# Patient Record
Sex: FEMALE | Race: BLACK OR AFRICAN AMERICAN | Employment: OTHER | URBAN - METROPOLITAN AREA
[De-identification: names, ages, dates, MRNs, and addresses within clinical notes are randomized per-mention and may not be internally consistent; named-entity substitution may affect disease eponyms.]

---

## 2022-04-02 ENCOUNTER — HOSPITAL ENCOUNTER (EMERGENCY)
Age: 82
Discharge: HOME | End: 2022-04-02
Attending: EMERGENCY MEDICINE
Payer: MEDICARE

## 2022-04-02 VITALS
HEART RATE: 90 BPM | SYSTOLIC BLOOD PRESSURE: 120 MMHG | WEIGHT: 130 LBS | BODY MASS INDEX: 23.78 KG/M2 | TEMPERATURE: 97.8 F | OXYGEN SATURATION: 100 % | DIASTOLIC BLOOD PRESSURE: 69 MMHG | RESPIRATION RATE: 18 BRPM

## 2022-04-02 DIAGNOSIS — T78.3XXA ANGIOTENSIN CONVERTING ENZYME INHIBITOR-AGGRAVATED ANGIOEDEMA, INITIAL ENCOUNTER: Primary | ICD-10-CM

## 2022-04-02 DIAGNOSIS — T46.4X5A ANGIOTENSIN CONVERTING ENZYME INHIBITOR-AGGRAVATED ANGIOEDEMA, INITIAL ENCOUNTER: Primary | ICD-10-CM

## 2022-04-02 PROCEDURE — 6370000000 HC RX 637 (ALT 250 FOR IP): Performed by: EMERGENCY MEDICINE

## 2022-04-02 PROCEDURE — 6360000002 HC RX W HCPCS: Performed by: EMERGENCY MEDICINE

## 2022-04-02 PROCEDURE — 2500000003 HC RX 250 WO HCPCS: Performed by: EMERGENCY MEDICINE

## 2022-04-02 PROCEDURE — 99285 EMERGENCY DEPT VISIT HI MDM: CPT

## 2022-04-02 PROCEDURE — 2580000003 HC RX 258: Performed by: EMERGENCY MEDICINE

## 2022-04-02 PROCEDURE — 96365 THER/PROPH/DIAG IV INF INIT: CPT

## 2022-04-02 PROCEDURE — 96375 TX/PRO/DX INJ NEW DRUG ADDON: CPT

## 2022-04-02 RX ORDER — HYDROCHLOROTHIAZIDE 25 MG/1
25 TABLET ORAL DAILY
Qty: 30 TABLET | Refills: 3 | Status: SHIPPED | OUTPATIENT
Start: 2022-04-02

## 2022-04-02 RX ORDER — DIPHENHYDRAMINE HYDROCHLORIDE 50 MG/ML
25 INJECTION INTRAMUSCULAR; INTRAVENOUS ONCE
Status: COMPLETED | OUTPATIENT
Start: 2022-04-02 | End: 2022-04-02

## 2022-04-02 RX ORDER — PREDNISONE 20 MG/1
60 TABLET ORAL ONCE
Status: COMPLETED | OUTPATIENT
Start: 2022-04-02 | End: 2022-04-02

## 2022-04-02 RX ORDER — TRANEXAMIC ACID 100 MG/ML
INJECTION, SOLUTION INTRAVENOUS
Status: DISCONTINUED
Start: 2022-04-02 | End: 2022-04-02 | Stop reason: HOSPADM

## 2022-04-02 RX ORDER — PREDNISONE 20 MG/1
TABLET ORAL
Qty: 21 TABLET | Refills: 0 | Status: SHIPPED | OUTPATIENT
Start: 2022-04-02

## 2022-04-02 RX ADMIN — TRANEXAMIC ACID 1000 MG: 1 INJECTION, SOLUTION INTRAVENOUS at 11:04

## 2022-04-02 RX ADMIN — PREDNISONE 60 MG: 20 TABLET ORAL at 10:41

## 2022-04-02 RX ADMIN — DIPHENHYDRAMINE HYDROCHLORIDE 25 MG: 50 INJECTION, SOLUTION INTRAMUSCULAR; INTRAVENOUS at 10:41

## 2022-04-02 NOTE — ED PROVIDER NOTES
HPI:  4/2/22,   Time: 10:33 AM EDT       Stella Parra is a 78 y.o. female presenting to the ED for facial swelling, beginning 1 day ago. The complaint has been persistent, moderate in severity, and worsened by nothing. On ace, started yesterday, swelling to lips and face. No difficulty breathing. Skin is dry. No fever/chills/sweats/n/v/d/cough/congest.  Bib private vehicle. Nothing makes better. No new meds/creams/soaps    Review of Systems:   Pertinent positives and negatives are stated within HPI, all other systems reviewed and are negative.          --------------------------------------------- PAST HISTORY ---------------------------------------------  Past Medical History:  has a past medical history of Hyperlipidemia, Hypertension, and Thyroid disease. Past Surgical History:  has a past surgical history that includes Breast surgery and Thyroidectomy. Social History:  reports that she has never smoked. She has never used smokeless tobacco.    Family History: family history is not on file. The patients home medications have been reviewed. Allergies: Patient has no known allergies. ---------------------------------------------------PHYSICAL EXAM--------------------------------------    Constitutional/General: Alert and oriented x3, well appearing, non toxic in NAD  Head: Normocephalic and swelling mychal cheeks and lips, skin is dry, no erythema  Eyes: PERRL, EOMI, conjunctive normal, sclera non icteric  Mouth: Oropharynx clear, handling secretions, no trismus, no asymmetry of the posterior oropharynx or uvular edema  Neck: Supple, full ROM,   Respiratory:  Not in respiratory distress  Cardiovascular:  . 2+ distal pulses    GI:  Abdomen Soft, Non tender, Non distended. Musculoskeletal: Moves all extremities x 4. Warm and well perfused, no clubbing, cyanosis, or edema. Capillary refill <3 seconds  Integument: skin warm and dry. No rashes.    Lymphatic: no lymphadenopathy noted  Neurologic: GCS 15, no focal deficits,   Psychiatric: Normal Affect    -------------------------------------------------- RESULTS -------------------------------------------------  I have personally reviewed all laboratory and imaging results for this patient. Results are listed below. LABS:  No results found for this visit on 04/02/22. RADIOLOGY:  Interpreted by Radiologist.  No orders to display       EKG: This EKG is signed and interpreted by the EP. Time:   Rate:   Rhythm:   Interpretation:   Comparison:       ------------------------- NURSING NOTES AND VITALS REVIEWED ---------------------------   The nursing notes within the ED encounter and vital signs as below have been reviewed by myself. /69   Pulse 90   Temp 97.8 °F (36.6 °C) (Oral)   Resp 18   Wt 130 lb (59 kg)   SpO2 100%   BMI 23.78 kg/m²   Oxygen Saturation Interpretation: Normal    The patients available past medical records and past encounters were reviewed. ------------------------------ ED COURSE/MEDICAL DECISION MAKING----------------------  Medications   tranexamic acid (CYKLOKAPRON) 1000 MG/10ML injection (has no administration in time range)   predniSONE (DELTASONE) tablet 60 mg (60 mg Oral Given 4/2/22 1041)   diphenhydrAMINE (BENADRYL) injection 25 mg (25 mg IntraVENous Given 4/2/22 1041)   tranexamic acid (CYKLOKAPRON) 1,000 mg in dextrose 5 % 100 mL IVPB (0 mg IntraVENous Stopped 4/2/22 1120)         ED COURSE:       Medical Decision Making:    Clinically ace angioedema, meds given, no progression, pt told to stop ace, hctz ordered to replace. Dc on steroids/outpt fu      This patient's ED course included: a personal history and physicial examination    This patient has remained hemodynamically stable during their ED course. Re-Evaluations:             Re-evaluation.   Patients symptoms show no change    Re-examination  4/2/22   3pM EDT          Vital Signs:   Vitals:    04/02/22 1130 04/02/22 1200 04/02/22 1300 04/02/22 1415   BP:  119/63 129/70 120/69   Pulse: 86 85 92 90   Resp:  18 17 18   Temp:       TempSrc:       SpO2: 98% 98% 100% 100%   Weight:         No change in condition, airway patent, no post oropharynx swelling        Counseling: The emergency provider has spoken with the patient and discussed todays results, in addition to providing specific details for the plan of care and counseling regarding the diagnosis and prognosis. Questions are answered at this time and they are agreeable with the plan.       --------------------------------- IMPRESSION AND DISPOSITION ---------------------------------    IMPRESSION  1. Angiotensin converting enzyme inhibitor-aggravated angioedema, initial encounter        DISPOSITION  Disposition: Discharge to home  Patient condition is stable    NOTE: This report was transcribed using voice recognition software.  Every effort was made to ensure accuracy; however, inadvertent computerized transcription errors may be present        Mirna Clark MD  04/02/22 7173

## 2022-12-27 ENCOUNTER — HOSPITAL ENCOUNTER (EMERGENCY)
Age: 82
Discharge: HOME | End: 2022-12-27
Attending: EMERGENCY MEDICINE
Payer: MEDICARE

## 2022-12-27 VITALS
SYSTOLIC BLOOD PRESSURE: 166 MMHG | OXYGEN SATURATION: 97 % | WEIGHT: 115 LBS | BODY MASS INDEX: 20.38 KG/M2 | DIASTOLIC BLOOD PRESSURE: 72 MMHG | RESPIRATION RATE: 17 BRPM | HEIGHT: 63 IN | TEMPERATURE: 98.2 F | HEART RATE: 73 BPM

## 2022-12-27 DIAGNOSIS — T78.3XXA ANGIOEDEMA, INITIAL ENCOUNTER: Primary | ICD-10-CM

## 2022-12-27 PROCEDURE — 2580000003 HC RX 258: Performed by: NURSE PRACTITIONER

## 2022-12-27 PROCEDURE — 2500000003 HC RX 250 WO HCPCS: Performed by: NURSE PRACTITIONER

## 2022-12-27 PROCEDURE — 6360000002 HC RX W HCPCS: Performed by: NURSE PRACTITIONER

## 2022-12-27 PROCEDURE — A4216 STERILE WATER/SALINE, 10 ML: HCPCS | Performed by: NURSE PRACTITIONER

## 2022-12-27 PROCEDURE — 96365 THER/PROPH/DIAG IV INF INIT: CPT

## 2022-12-27 PROCEDURE — 99284 EMERGENCY DEPT VISIT MOD MDM: CPT

## 2022-12-27 PROCEDURE — 96375 TX/PRO/DX INJ NEW DRUG ADDON: CPT

## 2022-12-27 RX ORDER — DIPHENHYDRAMINE HYDROCHLORIDE 50 MG/ML
25 INJECTION INTRAMUSCULAR; INTRAVENOUS ONCE
Status: COMPLETED | OUTPATIENT
Start: 2022-12-27 | End: 2022-12-27

## 2022-12-27 RX ORDER — PREDNISONE 50 MG/1
50 TABLET ORAL DAILY
Qty: 5 TABLET | Refills: 0 | Status: SHIPPED | OUTPATIENT
Start: 2022-12-27 | End: 2023-01-01

## 2022-12-27 RX ORDER — METHYLPREDNISOLONE SODIUM SUCCINATE 125 MG/2ML
125 INJECTION, POWDER, LYOPHILIZED, FOR SOLUTION INTRAMUSCULAR; INTRAVENOUS ONCE
Status: COMPLETED | OUTPATIENT
Start: 2022-12-27 | End: 2022-12-27

## 2022-12-27 RX ADMIN — METHYLPREDNISOLONE SODIUM SUCCINATE 125 MG: 125 INJECTION, POWDER, FOR SOLUTION INTRAMUSCULAR; INTRAVENOUS at 03:25

## 2022-12-27 RX ADMIN — TRANEXAMIC ACID 1000 MG: 1 INJECTION, SOLUTION INTRAVENOUS at 04:39

## 2022-12-27 RX ADMIN — DIPHENHYDRAMINE HYDROCHLORIDE 25 MG: 50 INJECTION, SOLUTION INTRAMUSCULAR; INTRAVENOUS at 03:23

## 2022-12-27 RX ADMIN — FAMOTIDINE 20 MG: 10 INJECTION, SOLUTION INTRAVENOUS at 03:27

## 2022-12-27 ASSESSMENT — PAIN DESCRIPTION - DESCRIPTORS: DESCRIPTORS: BURNING

## 2022-12-27 ASSESSMENT — PAIN - FUNCTIONAL ASSESSMENT
PAIN_FUNCTIONAL_ASSESSMENT: NONE - DENIES PAIN
PAIN_FUNCTIONAL_ASSESSMENT: 0-10

## 2022-12-27 ASSESSMENT — PAIN DESCRIPTION - ORIENTATION: ORIENTATION: RIGHT;LEFT

## 2022-12-27 ASSESSMENT — LIFESTYLE VARIABLES: HOW OFTEN DO YOU HAVE A DRINK CONTAINING ALCOHOL: NEVER

## 2022-12-27 ASSESSMENT — PAIN DESCRIPTION - LOCATION: LOCATION: FACE

## 2022-12-27 NOTE — ED PROVIDER NOTES
ED physician  HPI:  12/27/22, Time: 3:36 AM PIA Membreno is a 78 y.o. female presenting to the ED for rash as well as upper lip swelling. Patient presents to the emergency department with noticing originally a rash to her right side of her nose and upper lip area on Friday. States that she has been putting on Vicks to the site. States that since then she has been having increasing swelling primarily to her upper lip. Patient is not on any ACE inhibitor's. She has had similar episodes and was seen April 2022 with angioedema. Patient denies any throat tightening or drooling or any shortness of breath as well as no noted wheezing. Patient denies any new meds, soaps, lotions or detergents. States that she notices that it still swelling and not getting any improvement. Unaware of where she got the rash. She also denies any fevers. Patient also denies any chest pains. Patient denies taking any other meds except using the Vicks for comfort relief. Patient with symptoms moderate in severity and persistent  Review of Systems:   A complete review of systems was performed and pertinent positives and negatives are stated within HPI, all other systems reviewed and are negative.          --------------------------------------------- PAST HISTORY ---------------------------------------------  Past Medical History:  has a past medical history of Hyperlipidemia, Hypertension, and Thyroid disease. Past Surgical History:  has a past surgical history that includes Breast surgery and Thyroidectomy. Social History:  reports that she has never smoked. She has never used smokeless tobacco.    Family History: family history is not on file. The patients home medications have been reviewed. Allergies: Patient has no known allergies.     -------------------------------------------------- RESULTS -------------------------------------------------  All laboratory and radiology results have been personally reviewed by myself   LABS:  No results found for this visit on 12/27/22. RADIOLOGY:  Interpreted by Radiologist.  No orders to display       ------------------------- NURSING NOTES AND VITALS REVIEWED ---------------------------   The nursing notes within the ED encounter and vital signs as below have been reviewed. BP (!) 166/72   Pulse 73   Temp 98.2 °F (36.8 °C)   Resp 17   Ht 5' 3\" (1.6 m)   Wt 115 lb (52.2 kg)   SpO2 97%   BMI 20.37 kg/m²   Oxygen Saturation Interpretation: Normal      ---------------------------------------------------PHYSICAL EXAM--------------------------------------      Constitutional/General: Alert and oriented x3, moderately uncomfortable   head: Normocephalic and atraumatic  Eyes: PERRL, EOMI  Mouth: Oropharynx clear, handling secretions, no trismus, negative for any swelling noted to the tongue but does have upper lip swelling with notable erythema   Patient also with erythema and slight nasal swelling. Neck: Supple, full ROM,   Pulmonary: Lungs clear to auscultation bilaterally, no wheezes, rales, or rhonchi. Not in respiratory distress, no wheezing no stridor, lungs remain clear throughout all fields anteriorly sign or and posteriorly  Cardiovascular:  Regular rate and rhythm, no murmurs, gallops, or rubs. 2+ distal pulses  Abdomen: Soft, non tender, non distended,   Extremities: Moves all extremities x 4. Warm and well perfused  Skin: warm and dry,  negative for any swelling noted to the tongue but does have upper lip swelling with notable erythema above upper lip. Patient also with erythema and slight nasal swelling.   Neurologic: GCS 15,  Psych: Normal Affect to      ------------------------------ ED COURSE/MEDICAL DECISION MAKING----------------------  Medications   famotidine (PEPCID) 20 mg in sodium chloride (PF) 0.9 % 10 mL injection (20 mg IntraVENous Given 12/27/22 0327)   methylPREDNISolone sodium (SOLU-MEDROL) injection 125 mg (125 mg IntraVENous Given 12/27/22 0325)   diphenhydrAMINE (BENADRYL) injection 25 mg (25 mg IntraVENous Given 12/27/22 0323)   tranexamic acid (CYKLOKAPRON) 1,000 mg in sodium chloride 0.9 % 100 mL IVPB (0 mg IntraVENous Stopped 12/27/22 0529)         ED COURSE:  ED Course as of 12/27/22 0638   Tue Dec 27, 2022   1151 Patient reexamined at this time. Patient doing much better. Swelling improved. Patient feeling well. Patient will undergo further observation. [CB]   6368 Reexamined at this time. Swelling completely resolved. Patient feeling at baseline. No shortness of breath. No difficulty swallowing. Vital signs remained stable. Counseled patient on angioedema. Patient stable for discharge. She will follow-up with her PCP. Return precautions given. Patient agrees with plan. [CB]      ED Course User Index  [CB] Filipe Suarez DO       Medical Decision Making:    Plan will be to provide patient with IV Pepcid, IV Solu-Medrol as well as IV Benadryl as well as provide her with continuous pulse ox. Patient does have notable upper lip swelling as well as notable erythema  I did review patient's medications and patient is not on any ACE inhibitor's. She also denies any new meds, soaps, lotions or detergents. Airway intact. No wheezing or stridor. Speech clear and coherent. Will provide patient with TXA IV and then reevaluate. 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 I did go back in to reevaluate patient, she was resting comfortably, lungs do remain clear, no wheezing no stridor. The upper lip is just slightly decreased she does report already feeling markedly improved after receiving the IV Pepcid 20 mg, IV Solu-Medrol 125 mg as well as IV Benadryl 25 mg. Will provide patient with IV TXA and then once again reevaluate. Patient signed off to emergency room attending. Counseling:    The emergency provider has spoken with the patient and discussed todays results, in addition to providing specific details for the plan of care and counseling regarding the diagnosis and prognosis. Questions are answered at this time and they are agreeable with the plan.      --------------------------------- IMPRESSION AND DISPOSITION ---------------------------------    IMPRESSION  1. Angioedema, initial encounter        DISPOSITION  Disposition:   Patient condition is good      NOTE: This report was transcribed using voice recognition software.  Every effort was made to ensure accuracy; however, inadvertent computerized transcription errors may be present          STEPHANIE Lao CNP  12/27/22 222 Sae Andrade DO  12/27/22 4924

## 2022-12-27 NOTE — ED NOTES
Patient verbalized understanding of dc and follow up orders as well as medication administration. Iv discontinued, catheter intact, no signs of infiltration or adverse effects, patient ambulated and walked out of ed, no further questions, nad.       Neto Nix RN  12/27/22 7279

## 2023-02-27 ENCOUNTER — APPOINTMENT (OUTPATIENT)
Dept: GENERAL RADIOLOGY | Age: 83
DRG: 181 | End: 2023-02-27
Payer: MEDICARE

## 2023-02-27 ENCOUNTER — APPOINTMENT (OUTPATIENT)
Dept: CT IMAGING | Age: 83
DRG: 181 | End: 2023-02-27
Payer: MEDICARE

## 2023-02-27 ENCOUNTER — HOSPITAL ENCOUNTER (INPATIENT)
Age: 83
LOS: 8 days | Discharge: HOME | DRG: 181 | End: 2023-03-08
Attending: EMERGENCY MEDICINE | Admitting: INTERNAL MEDICINE
Payer: MEDICARE

## 2023-02-27 DIAGNOSIS — R91.8 PULMONARY NODULES: ICD-10-CM

## 2023-02-27 DIAGNOSIS — C79.9 METASTATIC MALIGNANT NEOPLASM, UNSPECIFIED SITE (HCC): Primary | ICD-10-CM

## 2023-02-27 DIAGNOSIS — R18.8 OTHER ASCITES: ICD-10-CM

## 2023-02-27 LAB
ALBUMIN SERPL-MCNC: 3.4 G/DL (ref 3.5–5.2)
ALP BLD-CCNC: 60 U/L (ref 35–104)
ALT SERPL-CCNC: 5 U/L (ref 0–32)
ANION GAP SERPL CALCULATED.3IONS-SCNC: 15 MMOL/L (ref 7–16)
AST SERPL-CCNC: 21 U/L (ref 0–31)
BACTERIA: ABNORMAL /HPF
BASOPHILS ABSOLUTE: 0.06 E9/L (ref 0–0.2)
BASOPHILS RELATIVE PERCENT: 0.5 % (ref 0–2)
BILIRUB SERPL-MCNC: 0.5 MG/DL (ref 0–1.2)
BILIRUBIN URINE: ABNORMAL
BLOOD, URINE: ABNORMAL
BUN BLDV-MCNC: 20 MG/DL (ref 6–23)
CALCIUM SERPL-MCNC: 9.2 MG/DL (ref 8.6–10.2)
CHLORIDE BLD-SCNC: 97 MMOL/L (ref 98–107)
CLARITY: CLEAR
CO2: 27 MMOL/L (ref 22–29)
COLOR: YELLOW
CREAT SERPL-MCNC: 1.1 MG/DL (ref 0.5–1)
EOSINOPHILS ABSOLUTE: 0.02 E9/L (ref 0.05–0.5)
EOSINOPHILS RELATIVE PERCENT: 0.2 % (ref 0–6)
GFR SERPL CREATININE-BSD FRML MDRD: 49 ML/MIN/1.73
GLUCOSE BLD-MCNC: 87 MG/DL (ref 74–99)
GLUCOSE URINE: NEGATIVE MG/DL
HCT VFR BLD CALC: 40.9 % (ref 34–48)
HEMOGLOBIN: 14.2 G/DL (ref 11.5–15.5)
IMMATURE GRANULOCYTES #: 0.04 E9/L
IMMATURE GRANULOCYTES %: 0.3 % (ref 0–5)
KETONES, URINE: 15 MG/DL
LACTIC ACID: 1.9 MMOL/L (ref 0.5–2.2)
LEUKOCYTE ESTERASE, URINE: ABNORMAL
LIPASE: 12 U/L (ref 13–60)
LYMPHOCYTES ABSOLUTE: 1.6 E9/L (ref 1.5–4)
LYMPHOCYTES RELATIVE PERCENT: 13.9 % (ref 20–42)
MCH RBC QN AUTO: 26.6 PG (ref 26–35)
MCHC RBC AUTO-ENTMCNC: 34.7 % (ref 32–34.5)
MCV RBC AUTO: 76.6 FL (ref 80–99.9)
MONOCYTES ABSOLUTE: 0.73 E9/L (ref 0.1–0.95)
MONOCYTES RELATIVE PERCENT: 6.4 % (ref 2–12)
NEUTROPHILS ABSOLUTE: 9.04 E9/L (ref 1.8–7.3)
NEUTROPHILS RELATIVE PERCENT: 78.7 % (ref 43–80)
NITRITE, URINE: NEGATIVE
PDW BLD-RTO: 13.2 FL (ref 11.5–15)
PH UA: 5.5 (ref 5–9)
PLATELET # BLD: 303 E9/L (ref 130–450)
PMV BLD AUTO: 11.3 FL (ref 7–12)
POTASSIUM SERPL-SCNC: 4.1 MMOL/L (ref 3.5–5)
PROTEIN UA: 30 MG/DL
RBC # BLD: 5.34 E12/L (ref 3.5–5.5)
RBC UA: ABNORMAL /HPF (ref 0–2)
SODIUM BLD-SCNC: 139 MMOL/L (ref 132–146)
SPECIFIC GRAVITY UA: >=1.03 (ref 1–1.03)
TOTAL PROTEIN: 6.7 G/DL (ref 6.4–8.3)
TROPONIN, HIGH SENSITIVITY: 14 NG/L (ref 0–9)
TROPONIN, HIGH SENSITIVITY: 14 NG/L (ref 0–9)
TROPONIN, HIGH SENSITIVITY: 16 NG/L (ref 0–9)
UROBILINOGEN, URINE: 1 E.U./DL
WBC # BLD: 11.5 E9/L (ref 4.5–11.5)
WBC UA: ABNORMAL /HPF (ref 0–5)

## 2023-02-27 PROCEDURE — 6360000002 HC RX W HCPCS

## 2023-02-27 PROCEDURE — 81001 URINALYSIS AUTO W/SCOPE: CPT

## 2023-02-27 PROCEDURE — 85025 COMPLETE CBC W/AUTO DIFF WBC: CPT

## 2023-02-27 PROCEDURE — 80053 COMPREHEN METABOLIC PANEL: CPT

## 2023-02-27 PROCEDURE — 96374 THER/PROPH/DIAG INJ IV PUSH: CPT

## 2023-02-27 PROCEDURE — 83690 ASSAY OF LIPASE: CPT

## 2023-02-27 PROCEDURE — 99285 EMERGENCY DEPT VISIT HI MDM: CPT

## 2023-02-27 PROCEDURE — 6360000004 HC RX CONTRAST MEDICATION: Performed by: RADIOLOGY

## 2023-02-27 PROCEDURE — 84484 ASSAY OF TROPONIN QUANT: CPT

## 2023-02-27 PROCEDURE — 2580000003 HC RX 258

## 2023-02-27 PROCEDURE — 71045 X-RAY EXAM CHEST 1 VIEW: CPT

## 2023-02-27 PROCEDURE — 83605 ASSAY OF LACTIC ACID: CPT

## 2023-02-27 PROCEDURE — 74177 CT ABD & PELVIS W/CONTRAST: CPT

## 2023-02-27 PROCEDURE — 2580000003 HC RX 258: Performed by: RADIOLOGY

## 2023-02-27 PROCEDURE — 93005 ELECTROCARDIOGRAM TRACING: CPT | Performed by: PHYSICIAN ASSISTANT

## 2023-02-27 RX ORDER — PHENOL 1.4 %
1 AEROSOL, SPRAY (ML) MUCOUS MEMBRANE 2 TIMES DAILY
COMMUNITY

## 2023-02-27 RX ORDER — ALENDRONATE SODIUM 70 MG/1
70 TABLET ORAL
COMMUNITY
Start: 2023-01-17

## 2023-02-27 RX ORDER — SODIUM CHLORIDE 0.9 % (FLUSH) 0.9 %
10 SYRINGE (ML) INJECTION PRN
Status: DISCONTINUED | OUTPATIENT
Start: 2023-02-27 | End: 2023-02-28

## 2023-02-27 RX ORDER — 0.9 % SODIUM CHLORIDE 0.9 %
1000 INTRAVENOUS SOLUTION INTRAVENOUS ONCE
Status: COMPLETED | OUTPATIENT
Start: 2023-02-27 | End: 2023-02-28

## 2023-02-27 RX ORDER — FERROUS SULFATE 325(65) MG
325 TABLET ORAL NIGHTLY
COMMUNITY

## 2023-02-27 RX ORDER — ACETAMINOPHEN 160 MG
2000 TABLET,DISINTEGRATING ORAL DAILY
COMMUNITY

## 2023-02-27 RX ORDER — ASPIRIN 81 MG/1
81 TABLET ORAL DAILY
COMMUNITY

## 2023-02-27 RX ORDER — SIMVASTATIN 20 MG
20 TABLET ORAL NIGHTLY
COMMUNITY
Start: 2023-01-17

## 2023-02-27 RX ORDER — MULTIVITAMIN WITH FOLIC ACID 400 MCG
1 TABLET ORAL NIGHTLY
COMMUNITY
Start: 2022-11-29

## 2023-02-27 RX ORDER — LEVOTHYROXINE SODIUM 0.07 MG/1
75 TABLET ORAL DAILY
COMMUNITY
Start: 2023-01-17

## 2023-02-27 RX ORDER — ONDANSETRON 2 MG/ML
4 INJECTION INTRAMUSCULAR; INTRAVENOUS ONCE
Status: COMPLETED | OUTPATIENT
Start: 2023-02-27 | End: 2023-02-27

## 2023-02-27 RX ADMIN — ONDANSETRON 4 MG: 2 INJECTION INTRAMUSCULAR; INTRAVENOUS at 21:38

## 2023-02-27 RX ADMIN — Medication 10 ML: at 22:24

## 2023-02-27 RX ADMIN — IOPAMIDOL 75 ML: 755 INJECTION, SOLUTION INTRAVENOUS at 22:23

## 2023-02-27 RX ADMIN — SODIUM CHLORIDE 1000 ML: 9 INJECTION, SOLUTION INTRAVENOUS at 21:38

## 2023-02-27 ASSESSMENT — PAIN - FUNCTIONAL ASSESSMENT: PAIN_FUNCTIONAL_ASSESSMENT: NONE - DENIES PAIN

## 2023-02-27 ASSESSMENT — LIFESTYLE VARIABLES: HOW MANY STANDARD DRINKS CONTAINING ALCOHOL DO YOU HAVE ON A TYPICAL DAY: PATIENT DOES NOT DRINK

## 2023-02-27 NOTE — LETTER
[place] Department Medicaid  CERTIFICATION OF NECESSITY  FOR NON-EMERGENCY TRANSPORTATION   BY GROUND AMBULANCE      Individual Information   1. Name: Kimmie Shetty 2. [place] Medicaid Billing Number:    3. Address: [address] [place] [address]      Transportation Provider Information   4. Provider Name:    5. [place] Medicaid Provider Number:  National Provider Identifier (NPI):      Certification  7. Criteria:  During transport, this individual requires:  [x] Medical treatment or continuous     supervision by an EMT. [] The administration or regulation of oxygen by another person. [] Supervised protective restraint. 8. Period Beginning Date: 3/8/23   9. Length   [x] Not more than 1 day(s)  [] One Year     Additional Information Relevant to Certification   10. 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 Name of Practitioner: Aliza Sol MD   12. [place] Medicaid Provider Number, If Applicable:  [address] Provider Identifier (NPI):      Signature Information   14. Date of Signature: 3/8/23 15. Name of Person Signing: Hua Vines   12. Signature and Professional Designation: Electronically signed by SHARMAINE Miller on 3/8/2023 at 3:34 PM     [facility] [id number]  Rev. 7/2015          [address] Encounter Date/Time: 2/27/2023 [facility]    Hospital Account: [de-identified]    MRN: [id number]    Patient: Melinda Tello Serial #: [id number]      ENCOUNTER          Patient Class: I Private Enc?   No Unit RM BD: SEYZ [id number]   Hospital Service: MED   Encounter DX: Metastatic cancer to [facility]*   ADM Provider: Francine Mason DO   Procedure:     ATT Provider: Aliza Sol MD   REF Provider:        Admission DX: Metastatic cancer to [facility]), Pulmonary nodules, Other ascites, Metastatic malignant neoplasm, unspecified site [facility]) and DX codes: C78.00, R91.8, R18.8, C79.9      PATIENT                 Name: Francois Fischer : 1940 (82 yrs)   Address: [address] [place] [address] Sex: Female   City: [address] [place] [address]         Marital Status:    Employer: RETIRED         [employer]: None   Primary Care Provider: Shayna Wise DO         Primary Phone: [phone]   EMERGENCY CONTACT   Contact Name Legal Guardian? Relationship to Patient Home Phone Work Phone   1. Sheila Corbett  2. Lyly Corbett      Child  Child    [phone]         GUARANTOR            Guarantor: Francois Fischer     : 1940   Address: [address] [place] [address] Sex: Female     [place] [address]     Relation to Patient: Self       Home Phone: [phone]   Guarantor ID: [id number]       Work Phone:     Guarantor Employer: RETIRED         Status: RETIRED      COVERAGE        PRIMARY INSURANCE   Payor: [facility] MEDICARE Plan: Jose BAUTISTA*   Payor Address: ,          Group Number:   Insurance Type: Count includes the [facility] Name: Calleen Due : 1940   Subscriber ID: [id number] Pat. Rel. to Sub: Self   SECONDARY INSURANCE   Payor: [place]ICA[place] Plan: [address] [place] [address] DEPT OF*   Payor Address:  [address], [address]          Group Number:   Insurance Type: INDEMNITY   Subscriber Name: Calleen Due : 1940   Subscriber ID: [id number] Pat.  Rel. to Sub: SELF      CSN: [id number]

## 2023-02-27 NOTE — ED NOTES
FIRST PROVIDER CONTACT ASSESSMENT NOTE       Department of Emergency Medicine                 First Provider Note            23  1:56 PM EST    Date of Encounter: No admission date for patient encounter. Patient Name: Ana Dye  : 1940  MRN: [id number]    Chief Complaint: No chief complaint on file. History of Present Illness:   Ana Dye is a 78 y.o. female who presents to the ED for lack of appetite for several weeks. Denies abdominal pain. Denies vomiting. Focused Physical Exam:  VS:    ED Triage Vitals [23 1217]   BP Temp Temp src Heart Rate Resp SpO2 Height Weight   -- 97.7 °F (36.5 °C) -- (!) 120 -- 96 % -- --        Physical Ex: Constitutional: Alert and non-toxic. Medical History:  has a past medical history of Hyperlipidemia, Hypertension, and Thyroid disease. Surgical History:  has a past surgical history that includes Breast surgery and Thyroidectomy. Social History:  reports that she has never smoked. She has never used smokeless tobacco.  Family History: family history is not on file. Allergies: Patient has no known allergies.      Initial Plan of Care: Initiate Treatment-Testing, Proceed toTreatment Area When Bed Available for ED Attending/MLP to Continue Care      ---END OF FIRST PROVIDER CONTACT ASSESSMENT NOTE---  Electronically signed by CARLOS Cruz   DD: 23       CARLOS Cruz  23 [id number]

## 2023-02-28 PROBLEM — C78.00 METASTATIC CANCER TO LUNG (HCC): Status: ACTIVE | Noted: 2023-02-28

## 2023-02-28 LAB
ALBUMIN SERPL-MCNC: 2.8 G/DL (ref 3.5–5.2)
ALP BLD-CCNC: 44 U/L (ref 35–104)
ALT SERPL-CCNC: 5 U/L (ref 0–32)
ANION GAP SERPL CALCULATED.3IONS-SCNC: 13 MMOL/L (ref 7–16)
AST SERPL-CCNC: 17 U/L (ref 0–31)
BASOPHILS ABSOLUTE: 0.05 E9/L (ref 0–0.2)
BASOPHILS RELATIVE PERCENT: 0.5 % (ref 0–2)
BILIRUB SERPL-MCNC: 0.4 MG/DL (ref 0–1.2)
BUN BLDV-MCNC: 22 MG/DL (ref 6–23)
CA 125: 77.6 U/ML (ref 0–35)
CALCIUM SERPL-MCNC: 8.1 MG/DL (ref 8.6–10.2)
CEA: 0.6 NG/ML (ref 0–5.2)
CHLORIDE BLD-SCNC: 102 MMOL/L (ref 98–107)
CO2: 25 MMOL/L (ref 22–29)
CREAT SERPL-MCNC: 1 MG/DL (ref 0.5–1)
EOSINOPHILS ABSOLUTE: 0.06 E9/L (ref 0.05–0.5)
EOSINOPHILS RELATIVE PERCENT: 0.6 % (ref 0–6)
GFR SERPL CREATININE-BSD FRML MDRD: 55 ML/MIN/1.73
GLUCOSE BLD-MCNC: 94 MG/DL (ref 74–99)
HCT VFR BLD CALC: 33.6 % (ref 34–48)
HEMOGLOBIN: 11.4 G/DL (ref 11.5–15.5)
IMMATURE GRANULOCYTES #: 0.06 E9/L
IMMATURE GRANULOCYTES %: 0.6 % (ref 0–5)
LYMPHOCYTES ABSOLUTE: 1.41 E9/L (ref 1.5–4)
LYMPHOCYTES RELATIVE PERCENT: 14.1 % (ref 20–42)
MCH RBC QN AUTO: 27 PG (ref 26–35)
MCHC RBC AUTO-ENTMCNC: 33.9 % (ref 32–34.5)
MCV RBC AUTO: 79.6 FL (ref 80–99.9)
MONOCYTES ABSOLUTE: 0.79 E9/L (ref 0.1–0.95)
MONOCYTES RELATIVE PERCENT: 7.9 % (ref 2–12)
NEUTROPHILS ABSOLUTE: 7.65 E9/L (ref 1.8–7.3)
NEUTROPHILS RELATIVE PERCENT: 76.3 % (ref 43–80)
PDW BLD-RTO: 13.2 FL (ref 11.5–15)
PLATELET # BLD: 243 E9/L (ref 130–450)
PMV BLD AUTO: 11.2 FL (ref 7–12)
POTASSIUM REFLEX MAGNESIUM: 3.8 MMOL/L (ref 3.5–5)
RBC # BLD: 4.22 E12/L (ref 3.5–5.5)
SODIUM BLD-SCNC: 140 MMOL/L (ref 132–146)
TOTAL PROTEIN: 5.1 G/DL (ref 6.4–8.3)
WBC # BLD: 10 E9/L (ref 4.5–11.5)

## 2023-02-28 PROCEDURE — 85025 COMPLETE CBC W/AUTO DIFF WBC: CPT

## 2023-02-28 PROCEDURE — 86304 IMMUNOASSAY TUMOR CA 125: CPT

## 2023-02-28 PROCEDURE — 6370000000 HC RX 637 (ALT 250 FOR IP): Performed by: FAMILY MEDICINE

## 2023-02-28 PROCEDURE — 2140000000 HC CCU INTERMEDIATE R&B

## 2023-02-28 PROCEDURE — 2580000003 HC RX 258: Performed by: FAMILY MEDICINE

## 2023-02-28 PROCEDURE — 80053 COMPREHEN METABOLIC PANEL: CPT

## 2023-02-28 PROCEDURE — 82378 CARCINOEMBRYONIC ANTIGEN: CPT

## 2023-02-28 RX ORDER — CALCIUM CARBONATE 200(500)MG
500 TABLET,CHEWABLE ORAL 3 TIMES DAILY PRN
Status: DISCONTINUED | OUTPATIENT
Start: 2023-02-28 | End: 2023-03-09 | Stop reason: HOSPADM

## 2023-02-28 RX ORDER — ACETAMINOPHEN 325 MG/1
650 TABLET ORAL EVERY 6 HOURS PRN
Status: DISCONTINUED | OUTPATIENT
Start: 2023-02-28 | End: 2023-03-09 | Stop reason: HOSPADM

## 2023-02-28 RX ORDER — POLYETHYLENE GLYCOL 3350 17 G/17G
17 POWDER, FOR SOLUTION ORAL DAILY PRN
Status: DISCONTINUED | OUTPATIENT
Start: 2023-02-28 | End: 2023-03-09 | Stop reason: HOSPADM

## 2023-02-28 RX ORDER — HYDRALAZINE HYDROCHLORIDE 20 MG/ML
10 INJECTION INTRAMUSCULAR; INTRAVENOUS EVERY 6 HOURS PRN
Status: DISCONTINUED | OUTPATIENT
Start: 2023-02-28 | End: 2023-03-09 | Stop reason: HOSPADM

## 2023-02-28 RX ORDER — SODIUM CHLORIDE 0.9 % (FLUSH) 0.9 %
10 SYRINGE (ML) INJECTION EVERY 12 HOURS SCHEDULED
Status: DISCONTINUED | OUTPATIENT
Start: 2023-02-28 | End: 2023-03-09 | Stop reason: HOSPADM

## 2023-02-28 RX ORDER — ASPIRIN 81 MG/1
81 TABLET ORAL DAILY
Status: DISCONTINUED | OUTPATIENT
Start: 2023-02-28 | End: 2023-03-09 | Stop reason: HOSPADM

## 2023-02-28 RX ORDER — LANOLIN ALCOHOL/MO/W.PET/CERES
3 CREAM (GRAM) TOPICAL NIGHTLY PRN
Status: DISCONTINUED | OUTPATIENT
Start: 2023-02-28 | End: 2023-03-09 | Stop reason: HOSPADM

## 2023-02-28 RX ORDER — ATORVASTATIN CALCIUM 10 MG/1
10 TABLET, FILM COATED ORAL DAILY
Status: DISCONTINUED | OUTPATIENT
Start: 2023-02-28 | End: 2023-03-09 | Stop reason: HOSPADM

## 2023-02-28 RX ORDER — FERROUS SULFATE 325(65) MG
325 TABLET ORAL
Status: DISCONTINUED | OUTPATIENT
Start: 2023-02-28 | End: 2023-03-09 | Stop reason: HOSPADM

## 2023-02-28 RX ORDER — ONDANSETRON 2 MG/ML
4 INJECTION INTRAMUSCULAR; INTRAVENOUS EVERY 6 HOURS PRN
Status: DISCONTINUED | OUTPATIENT
Start: 2023-02-28 | End: 2023-03-09 | Stop reason: HOSPADM

## 2023-02-28 RX ORDER — SODIUM CHLORIDE 9 MG/ML
INJECTION, SOLUTION INTRAVENOUS PRN
Status: DISCONTINUED | OUTPATIENT
Start: 2023-02-28 | End: 2023-03-09 | Stop reason: HOSPADM

## 2023-02-28 RX ORDER — HYDROCHLOROTHIAZIDE 25 MG/1
25 TABLET ORAL DAILY
Status: DISCONTINUED | OUTPATIENT
Start: 2023-02-28 | End: 2023-03-09 | Stop reason: HOSPADM

## 2023-02-28 RX ORDER — ENOXAPARIN SODIUM 100 MG/ML
30 INJECTION SUBCUTANEOUS DAILY
Status: DISCONTINUED | OUTPATIENT
Start: 2023-02-28 | End: 2023-03-09 | Stop reason: HOSPADM

## 2023-02-28 RX ORDER — PROMETHAZINE HYDROCHLORIDE 12.5 MG/1
12.5 TABLET ORAL EVERY 6 HOURS PRN
Status: DISCONTINUED | OUTPATIENT
Start: 2023-02-28 | End: 2023-03-09 | Stop reason: HOSPADM

## 2023-02-28 RX ORDER — SODIUM CHLORIDE 0.9 % (FLUSH) 0.9 %
10 SYRINGE (ML) INJECTION PRN
Status: DISCONTINUED | OUTPATIENT
Start: 2023-02-28 | End: 2023-03-09 | Stop reason: HOSPADM

## 2023-02-28 RX ORDER — ACETAMINOPHEN 650 MG/1
650 SUPPOSITORY RECTAL EVERY 6 HOURS PRN
Status: DISCONTINUED | OUTPATIENT
Start: 2023-02-28 | End: 2023-03-09 | Stop reason: HOSPADM

## 2023-02-28 RX ADMIN — SODIUM CHLORIDE, PRESERVATIVE FREE 10 ML: 5 INJECTION INTRAVENOUS at 21:32

## 2023-02-28 RX ADMIN — LEVOTHYROXINE SODIUM 75 MCG: 0.07 TABLET ORAL at 07:43

## 2023-02-28 RX ADMIN — SODIUM CHLORIDE, PRESERVATIVE FREE 10 ML: 5 INJECTION INTRAVENOUS at 16:52

## 2023-02-28 RX ADMIN — FERROUS SULFATE TAB 325 MG (65 MG ELEMENTAL FE) 325 MG: 325 (65 FE) TAB at 07:43

## 2023-02-28 NOTE — PROGRESS NOTES
Unable to reach RN at this time. Patient came to CT department with saline bag running in infiltrated left AC IV. CT removed and placed new IV and scan was preformed.

## 2023-02-28 NOTE — ED PROVIDER NOTES
[facility] ENCOUNTER        Pt Name: Bari Feldman  MRN: [id number]  [place] 1940  Date of evaluation: 2/27/2023  Provider: Saloni Sosa MD  PCP: Reyes Villarreal DO  Note Started: 11:14 PM EST 2/27/23    CHIEF COMPLAINT       Chief Complaint   Patient presents with    Other     Patient states for he last week she has had a loss of appetitie. She has been forcing herself to drink broth and water. Denies abdominal pain and vomiting. HISTORY OF PRESENT ILLNESS: 1 or more Elements   History From: Patient    Limitations to history : None    Bari Feldman is a 78 y.o. female who presents with worsening loss of appetite, nausea, and generalized weakness. Patient states that symptoms have been progressively worsening since 2019 states that she has been progressively losing weight due to poor appetite. She denies any abdominal pain or vomiting but does endorses nausea with eating. Patient states that she has been having some vaginal spotting and has had ultrasound of the pelvis and mammogram, she states that the mammogram resulted in dense breast tissue and a week ultrasound she is unclear but was told to follow-up with OB/GYN. States that she was unable to follow-up with OB/GYN due to transport issues. Describes symptoms moderate in severity with no alleviating or exacerbating factors. Denies any fever, chills, headache, dizziness, vision changes, neck tenderness or stiffness, cp, palpitations, leg swelling/tenderness, sob, cough, abd pain, dysuria, hematuria, diarrhea, constipation, bloody stools.     Nursing Notes were all reviewed and agreed with or any disagreements were addressed in the HPI.    ROS:   Pertinent positives and negatives are stated within HPI, all other systems reviewed and are negative.    --------------------------------------------- PAST HISTORY ---------------------------------------------  Past Medical History:  has a past medical history of Hyperlipidemia, Hypertension, and Thyroid disease. Past Surgical History:  has a past surgical history that includes Breast surgery and Thyroidectomy. Social History:  reports that she has never smoked. She has never used smokeless tobacco.    Family History: family history is not on file. The patients home medications have been reviewed. Allergies: Patient has no known allergies. ---------------------------------------------------PHYSICAL EXAM--------------------------------------        Constitutional/General: Alert and oriented x3, well appearing, non toxic in NAD  Head: Normocephalic and atraumatic  Mouth: Oropharynx clear, handling secretions, no trismus  Neck: Supple, full ROM,  Pulmonary: Lungs clear to auscultation bilaterally, no wheezes, rales, or rhonchi. Not in respiratory distress  Cardiovascular:  Regular rate. Regular rhythm. No murmurs  Chest: no chest wall tenderness  Abdomen: Soft. Non tender. Non distended. No rebound, guarding, or rigidity. No pulsatile masses appreciated. Musculoskeletal: Moves all extremities x 4. Neurovascularly intact. Warm and well perfused, no clubbing, cyanosis, or edema. Compartments are soft and compressible. Capillary refill <3 seconds. Skin: warm and dry. No rashes. Neurologic: GCS 15, no gross focal neurologic deficits  Psych: Normal Affect    -------------------------------------------------- RESULTS -------------------------------------------------  I have personally reviewed all laboratory and imaging results for this patient. Results are listed below.      LABS:  Results for orders placed or performed during the hospital encounter of 02/27/23   CBC with Auto Differential   Result Value Ref Range    WBC 11.5 4.5 - 11.5 E9/L    RBC 5.34 3.50 - 5.50 E12/L    Hemoglobin 14.2 11.5 - 15.5 g/dL    Hematocrit 40.9 34.0 - 48.0 %    MCV 76.6 (L) 80.0 - 99.9 fL    MCH 26.6 26.0 - 35.0 pg    MCHC 34.7 (H) 32.0 - 34.5 %    RDW 13.2 11.5 - 15.0 fL    Platelets 413 083 - 753 E9/L    MPV 11.3 7.0 - 12.0 fL    Neutrophils % 78.7 43.0 - 80.0 %    Immature Granulocytes % 0.3 0.0 - 5.0 %    Lymphocytes % 13.9 (L) 20.0 - 42.0 %    Monocytes % 6.4 2.0 - 12.0 %    Eosinophils % 0.2 0.0 - 6.0 %    Basophils % 0.5 0.0 - 2.0 %    Neutrophils Absolute 9.04 (H) 1.80 - 7.30 E9/L    Immature Granulocytes # 0.04 E9/L    Lymphocytes Absolute 1.60 1.50 - 4.00 E9/L    Monocytes Absolute 0.73 0.10 - 0.95 E9/L    Eosinophils Absolute 0.02 (L) 0.05 - 0.50 E9/L    Basophils Absolute 0.06 0.00 - 0.20 E9/L   Comprehensive Metabolic Panel   Result Value Ref Range    Sodium 139 132 - 146 mmol/L    Potassium 4.1 3.5 - 5.0 mmol/L    Chloride 97 (L) 98 - 107 mmol/L    CO2 27 22 - 29 mmol/L    Anion Gap 15 7 - 16 mmol/L    Glucose 87 74 - 99 mg/dL    BUN 20 6 - 23 mg/dL    Creatinine 1.1 (H) 0.5 - 1.0 mg/dL    Est, Glom Filt Rate 49 >=60 mL/min/1.73    Calcium 9.2 8.6 - 10.2 mg/dL    Total Protein 6.7 6.4 - 8.3 g/dL    Albumin 3.4 (L) 3.5 - 5.2 g/dL    Total Bilirubin 0.5 0.0 - 1.2 mg/dL    Alkaline Phosphatase 60 35 - 104 U/L    ALT 5 0 - 32 U/L    AST 21 0 - 31 U/L   Lactic Acid   Result Value Ref Range    Lactic Acid 1.9 0.5 - 2.2 mmol/L   Lipase   Result Value Ref Range    Lipase 12 (L) 13 - 60 U/L   Urinalysis with Microscopic   Result Value Ref Range    Color, UA Yellow Straw/Yellow    Clarity, UA Clear Clear    Glucose, Ur Negative Negative mg/dL    Bilirubin Urine LARGE (A) Negative    Ketones, Urine 15 (A) Negative mg/dL    Specific Gravity, UA >=1.030 1.005 - 1.030    Blood, Urine MODERATE (A) Negative    pH, UA 5.5 5.0 - 9.0    Protein, UA 30 (A) Negative mg/dL    Urobilinogen, Urine 1.0 <2.0 E.U./dL    Nitrite, Urine Negative Negative    Leukocyte Esterase, Urine TRACE (A) Negative    WBC, UA 2-5 0 - 5 /HPF    RBC, UA 2-5 0 - 2 /HPF    Bacteria, UA FEW (A) None Seen /HPF   Troponin   Result Value Ref Range    Troponin, High Sensitivity 14 (H) 0 - 9 ng/L   Troponin   Result Value Ref Range    Troponin, High Sensitivity 14 (H) 0 - 9 ng/L   Troponin   Result Value Ref Range    Troponin, High Sensitivity 16 (H) 0 - 9 ng/L   Comprehensive Metabolic Panel w/ Reflex to MG   Result Value Ref Range    Sodium 140 132 - 146 mmol/L    Potassium reflex Magnesium 3.8 3.5 - 5.0 mmol/L    Chloride 102 98 - 107 mmol/L    CO2 25 22 - 29 mmol/L    Anion Gap 13 7 - 16 mmol/L    Glucose 94 74 - 99 mg/dL    BUN 22 6 - 23 mg/dL    Creatinine 1.0 0.5 - 1.0 mg/dL    Est, Glom Filt Rate 55 >=60 mL/min/1.73    Calcium 8.1 (L) 8.6 - 10.2 mg/dL    Total Protein 5.1 (L) 6.4 - 8.3 g/dL    Albumin 2.8 (L) 3.5 - 5.2 g/dL    Total Bilirubin 0.4 0.0 - 1.2 mg/dL    Alkaline Phosphatase 44 35 - 104 U/L    ALT 5 0 - 32 U/L    AST 17 0 - 31 U/L   CBC with Auto Differential   Result Value Ref Range    WBC 10.0 4.5 - 11.5 E9/L    RBC 4.22 3.50 - 5.50 E12/L    Hemoglobin 11.4 (L) 11.5 - 15.5 g/dL    Hematocrit 33.6 (L) 34.0 - 48.0 %    MCV 79.6 (L) 80.0 - 99.9 fL    MCH 27.0 26.0 - 35.0 pg    MCHC 33.9 32.0 - 34.5 %    RDW 13.2 11.5 - 15.0 fL    Platelets 381 469 - 174 E9/L    MPV 11.2 7.0 - 12.0 fL    Neutrophils % 76.3 43.0 - 80.0 %    Immature Granulocytes % 0.6 0.0 - 5.0 %    Lymphocytes % 14.1 (L) 20.0 - 42.0 %    Monocytes % 7.9 2.0 - 12.0 %    Eosinophils % 0.6 0.0 - 6.0 %    Basophils % 0.5 0.0 - 2.0 %    Neutrophils Absolute 7.65 (H) 1.80 - 7.30 E9/L    Immature Granulocytes # 0.06 E9/L    Lymphocytes Absolute 1.41 (L) 1.50 - 4.00 E9/L    Monocytes Absolute 0.79 0.10 - 0.95 E9/L    Eosinophils Absolute 0.06 0.05 - 0.50 E9/L    Basophils Absolute 0.05 0.00 - 0.20 E9/L   EKG 12 Lead   Result Value Ref Range    Ventricular Rate 118 BPM    Atrial Rate 118 BPM    P-R Interval 116 ms    QRS Duration 88 ms    Q-T Interval 334 ms    QTc Calculation (Bazett) 468 ms    P Axis 74 degrees    R Axis -24 degrees    T Axis 82 degrees       RADIOLOGY:   Non-plain film images such as CT, Ultrasound and MRI are read by the radiologist. Adrianne Bells radiographic images are visualized and preliminarily interpreted by the ED Provider with the below findings:    CXR with bilateral nodules no obvious effusions or consolidations concerning pna, no ptx       Interpretation per the Radiologist below, if available at the time of this note:    CT ABDOMEN PELVIS W IV CONTRAST Additional Contrast? None   Final Result   Numerous pulmonary nodules within the visualized lung bases, concerning for   pulmonary metastatic disease. Moderate ascites with extensive peritoneal soft tissue nodules and omental   caking, compatible with the peritoneal metastatic disease. Leiomyomatous uterus. Densely sclerotic osseous lesions, overall increased in number and size from   prior examination. These may represent bone islands. However, given   findings above, osseous metastatic disease is not entirely excluded. XR CHEST PORTABLE   Final Result   Bilateral nodular opacities, concerning for underlying pulmonary nodules. Recommend attention on subsequent CT abdomen/pelvis. Clinical correlation   recommended. Consider further evaluation with dedicated CT chest, as   indicated. CT ABDOMEN PELVIS W IV CONTRAST Additional Contrast? None    Result Date: 2/27/2023  EXAMINATION: CT OF THE ABDOMEN AND PELVIS WITH CONTRAST 2/27/2023 10:17 pm TECHNIQUE: CT of the abdomen and pelvis was performed with the administration of intravenous contrast. Multiplanar reformatted images are provided for review. Automated exposure control, iterative reconstruction, and/or weight based adjustment of the mA/kV was utilized to reduce the radiation dose to as low as reasonably achievable.  COMPARISON: 10/13/2009 HISTORY: ORDERING SYSTEM PROVIDED HISTORY: nausea + weight loss TECHNOLOGIST PROVIDED HISTORY: Reason for exam:->nausea + weight loss Additional Contrast?->None Decision Support Exception - unselect if not a suspected or confirmed emergency medical condition->Emergency Medical Condition (MA) What reading provider will be dictating this exam?->CRC FINDINGS: Lower Chest: There are numerous pulmonary nodules within the visualized lung bases. A left lower lobe nodule measures 1.1 cm. A peripheral right lower lobe nodule measures 1 cm. There is trace right pleural effusion. Organs: The liver, gallbladder, spleen, pancreas, adrenals, and right kidney are unremarkable. There is a small left renal cyst. GI/Bowel: There is no evidence of bowel obstruction. No evidence of abnormal bowel wall thickening or distension. There are scattered diverticula. The appendix is not confidently identified. However, there is no inflammatory change in the right lower quadrant to suggest acute appendicitis. Pelvis: The urinary bladder is largely decompressed. Leiomyomatous uterus noted. Peritoneum/Retroperitoneum: There is moderate ascites. There are extensive peritoneal soft tissue nodules with omental caking. No evidence of lymphadenopathy. Aorta is normal in caliber. Bones/Soft Tissues: There are numerous densely sclerotic lesions throughout the visualized osseous structures, favored to represent enostoses. Numerous pulmonary nodules within the visualized lung bases, concerning for pulmonary metastatic disease. Moderate ascites with extensive peritoneal soft tissue nodules and omental caking, compatible with the peritoneal metastatic disease. Leiomyomatous uterus. Densely sclerotic osseous lesions, overall increased in number and size from prior examination. These may represent bone islands. However, given findings above, osseous metastatic disease is not entirely excluded.      XR CHEST PORTABLE    Result Date: 2/27/2023  EXAMINATION: ONE XRAY VIEW OF THE CHEST 2/27/2023 9:51 pm COMPARISON: 01/19/2017 HISTORY: ORDERING SYSTEM PROVIDED HISTORY: Shortness of breath TECHNOLOGIST PROVIDED HISTORY: Reason for exam:->Shortness of breath What reading provider will be dictating this exam?->CRC FINDINGS: The cardiomediastinal silhouette is within normal limits. There are scattered bilateral nodular opacities, measuring up to 9 mm. Streaky left basilar opacity, likely atelectasis. No pneumothorax or pleural effusion. Bilateral nodular opacities, concerning for underlying pulmonary nodules. Recommend attention on subsequent CT abdomen/pelvis. Clinical correlation recommended. Consider further evaluation with dedicated CT chest, as indicated. No results found. Procedures:      ------------------------- NURSING NOTES AND VITALS REVIEWED ---------------------------   The nursing notes within the ED encounter and vital signs as below have been reviewed by myself and ED attending. /68   Pulse 97   Temp 98.3 °F (36.8 °C)   Resp 16   Ht 5' 2\" (1.575 m)   Wt 109 lb (49.4 kg)   SpO2 95%   BMI 19.94 kg/m²   Oxygen Saturation Interpretation: Normal    The patients available past medical records and past encounters were reviewed by myself and ED attending        ------------------------------ ED COURSE/MEDICAL DECISION MAKING----------------------    Medical Decision Making/Differential Diagnosis:    CC/HPI Summary, Pertinent Physical Exam Findings, Social Determinants of health, Records Reviewed, DDx, testing done/not done, ED Course, Reassessment, disposition considerations/shared decision making with patient, consults, disposition:      Medical Decision Making/ED COURSE:    Chronic Conditions affecting care:    has a past medical history of Hyperlipidemia, Hypertension, and Thyroid disease. 78 y.o. female who presents with worsening loss of appetite, nausea, and generalized weakness. Myself and ED attending interpreted findings during patient's stay.    Vital signs /68   Pulse 97   Temp 98.3 °F (36.8 °C)   Resp 16   Ht 5' 2\" (1.575 m)   Wt 109 lb (49.4 kg)   SpO2 95%   BMI 19.94 kg/m²  Patient was placed on the cardiac monitor. Rhythm - Sinus tach. While in the ED patient was tachycardia but otherwise hemodynamically stable, afebrile, nontoxic-appearing, in no respiratory distress. Physical exam remarkable for  Working diagnosis include cancer, dehydration, ACS, electrolyte imbalance, anemia, UTI  Labs interpreted by me CBC with no leukocytosis or significant anemia,  CMP with stable renal and liver function, no electrolyte derangement. Lactic acid and lipase normal, trop 14 with delta of 2. CXR with bilateral nodular opacities, concerning for underlying pulmonary nodules. No effusions or consolidations, no ptx. CT abdomen numerous pulmonary nodules within the visualized lung bases, concerning for pulmonary metastatic disease. Moderate ascites with extensive peritoneal soft tissue nodules, compatible with the peritoneal metastatic disease. Leiomyomatous uterus. These may represent bone islands, osseous metastatic disease is not entirely excluded. EKG interpreted by me sinus tachycardia, LAE with no sign of acute ischemia. Patient administered IV: Fluids, Zofran. On reevaluation patient had significant symptomatic relief. She was updated on CT findings. Consultation with hospitalist.  The patient will be admitted for further treatment and evaluation for   1. Metastatic malignant neoplasm, unspecified site ([address]    2. Pulmonary nodules    3. Other ascites      IP CONSULT TO HOSPITALIST  IP CONSULT TO HEM/ONC      Counseling: The emergency provider has spoken with the patient and discussed todays results, in addition to providing specific details for the plan of care and counseling regarding the diagnosis and prognosis. Questions are answered at this time and they are agreeable with the plan. ED Course as of 02/28/23 0709   Mon Feb 27, 2023   0823 EKG: This EKG is signed by emergency department physician.     Rate: 118  Qtc: 468ms  Rhythm: Sinus tach   Interpretation: sinus tachycardia, LAE  Comparison: new sinus tachycardia otherwise stable as compared to patient's most recent EKG      [TC]   Tue Feb 28, 2023   0005 Patient admitted to Dr. Brandy Hunter MD       Medications   aspirin EC tablet 81 mg (has no administration in time range)   ferrous sulfate (IRON 325) tablet 325 mg (has no administration in time range)   hydroCHLOROthiazide (HYDRODIURIL) tablet 25 mg (has no administration in time range)   levothyroxine (SYNTHROID) tablet 75 mcg (has no administration in time range)   atorvastatin (LIPITOR) tablet 10 mg (has no administration in time range)   sodium chloride flush 0.9 % injection 10 mL (has no administration in time range)   sodium chloride flush 0.9 % injection 10 mL (has no administration in time range)   0.9 % sodium chloride infusion (has no administration in time range)   enoxaparin Sodium (LOVENOX) injection 30 mg (has no administration in time range)   promethazine (PHENERGAN) tablet 12.5 mg (has no administration in time range)     Or   ondansetron (ZOFRAN) injection 4 mg (has no administration in time range)   polyethylene glycol (GLYCOLAX) packet 17 g (has no administration in time range)   acetaminophen (TYLENOL) tablet 650 mg (has no administration in time range)     Or   acetaminophen (TYLENOL) suppository 650 mg (has no administration in time range)   0.9 % sodium chloride bolus (0 mLs IntraVENous Stopped 2/28/23 0118)   ondansetron (ZOFRAN) injection 4 mg (4 mg IntraVENous Given 2/27/23 2138)   iopamidol (ISOVUE-370) 76 % injection 75 mL (75 mLs IntraVENous Given 2/27/23 2223)       New Prescriptions    No medications on file         Discussion with Other Profesionals : Admitting Team      Social Determinants : None    Records Reviewed : Source previous x-ray in 2017 showing no nodules. CONSULTS: admitting       --------------------------------- IMPRESSION AND DISPOSITION ---------------------------------    IMPRESSION  1.  Metastatic malignant neoplasm, unspecified site ([facility] [address]    2. Pulmonary nodules    3. Other ascites        DISPOSITION  Disposition: Admit to telemetry  Patient condition is stable        NOTE: This report was transcribed using voice recognition software.  Every effort was made to ensure accuracy; however, inadvertent computerized transcription errors may be present           Maty Thomson MD  Resident  02/28/23 [id number]

## 2023-02-28 NOTE — CONSULTS
[facility]  Hematology/Oncology  Consult      Patient Name: Richardson Martinez  YOB: 1940  PCP: Trev Riley DO   Referring Provider:      Reason for Consultation:   Chief Complaint   Patient presents with    Other     Patient states for he last week she has had a loss of appetitie. She has been forcing herself to drink broth and water. Denies abdominal pain and vomiting. History of Present Illness: This is an 49-year-old female patient with a PMH of thyroid disease, HTN, HLD who presented to the ED for evaluation of worsening appetite, nausea, and generalized weakness. Patient is also reported vaginal bleeding in which she has had an ultrasound recently and was told to follow-up with OB/GYN however was unable to do so due to transport issues. Patient has been afebrile and on room air, however tachycardic. Chest x-ray with bilateral nodular opacities concerning for underlying pulmonary nodules. No effusions or consolidation. CTAP with numerous pulmonary nodules within the visualized lung bases concerning for pulmonary metastatic disease. Moderate ascites with extensive peritoneal soft tissue nodules, compatible with peritoneal metastatic disease. Leiomyomatous uterus. These may represent bone islands, osseous metastatic disease is not entirely excluded. Tumor markers with Ca 125 77.5, CEA 0.6. CMP with GFR 55 and calcium 8.1. Troponin 16. LFTs unremarkable. CBC with Hgb 11.4, MCV 79.6. Neutrophils 7.65. Consultation for concern of metastatic disease. Diagnostic Data:     Past Medical History:   Diagnosis Date    Hyperlipidemia     Hypertension     Thyroid disease        Patient Active Problem List    Diagnosis Date Noted    Metastatic cancer to lung ([facility] [address] 02/28/2023        Past Surgical History:   Procedure Laterality Date    BREAST SURGERY      THYROIDECTOMY         Family History  History reviewed. No pertinent family history.     Social History    TOBACCO: reports that she has never smoked. She has never used smokeless tobacco.  ETOH:   has no history on file for alcohol use. Home Medications  Prior to Admission medications    Medication Sig Start Date End Date Taking? Authorizing Provider   ferrous sulfate (IRON 325) 325 (65 Fe) MG tablet Take 325 mg by mouth daily (with breakfast)   Yes Historical Provider, MD   calcium carbonate 600 MG TABS tablet Take 1 tablet by mouth daily   Yes Historical Provider, MD   Cholecalciferol (VITAMIN D3) 50 MCG (2000 UT) CAPS Take by mouth   Yes Historical Provider, MD   aspirin 81 MG EC tablet Take 81 mg by mouth daily   Yes Historical Provider, MD   simvastatin (ZOCOR) 20 MG tablet  1/17/23   Historical Provider, MD   Multiple Vitamin (MULTIVITAMIN) tablet take 1 tablet by mouth once daily 11/29/22   Historical Provider, MD   alendronate (FOSAMAX) 70 MG tablet  1/17/23   Historical Provider, MD   levothyroxine (SYNTHROID) 75 MCG tablet  1/17/23   Historical Provider, MD   hydroCHLOROthiazide (HYDRODIURIL) 25 MG tablet Take 1 tablet by mouth daily 4/2/22   Sourav Bridges MD       Allergies  No Known Allergies    Review of Systems: Fatigue, weak, decreased appetite, weight loss. Objective  /68   Pulse 97   Temp 98.3 °F (36.8 °C)   Resp 16   Ht 5' 2\" (1.575 m)   Wt 109 lb (49.4 kg)   SpO2 95%   BMI 19.94 kg/m²     Physical Exam:     General: AAO to person, place, time, in no acute distress,   Head and neck : PERRLA, EOMI . Sclera non icteric. Oropharynx : Clear  Neck: no JVD,  no adenopathy  Heart: Regular rate and regular rhythm, tachycardic  Lungs: Clear to auscultation   Extremities: No edema,no cyanosis, no clubbing. Abdomen: Soft, non-tender;no masses, no organomegaly  Skin:  No rash. Neurologic:Cranial nerves grossly intact. No focal motor or sensory deficits .     Recent Laboratory Data-   Lab Results   Component Value Date    WBC 10.0 02/28/2023    HGB 11.4 (L) 02/28/2023    HCT 33.6 (L) 02/28/2023 MCV 79.6 (L) 02/28/2023     02/28/2023    LYMPHOPCT 14.1 (L) 02/28/2023    RBC 4.22 02/28/2023    MCH 27.0 02/28/2023    MCHC 33.9 02/28/2023    RDW 13.2 02/28/2023    NEUTOPHILPCT 76.3 02/28/2023    MONOPCT 7.9 02/28/2023    BASOPCT 0.5 02/28/2023    NEUTROABS 7.65 (H) 02/28/2023    LYMPHSABS 1.41 (L) 02/28/2023    MONOSABS 0.79 02/28/2023    EOSABS 0.06 02/28/2023    BASOSABS 0.05 02/28/2023       Lab Results   Component Value Date     02/28/2023    K 3.8 02/28/2023     02/28/2023    CO2 25 02/28/2023    BUN 22 02/28/2023    CREATININE 1.0 02/28/2023    GLUCOSE 94 02/28/2023    CALCIUM 8.1 (L) 02/28/2023    PROT 5.1 (L) 02/28/2023    LABALBU 2.8 (L) 02/28/2023    BILITOT 0.4 02/28/2023    ALKPHOS 44 02/28/2023    AST 17 02/28/2023    ALT 5 02/28/2023    LABGLOM 55 02/28/2023    GFRAA >60 01/19/2017       No results found for: IRON, TIBC, FERRITIN        Radiology-    CT ABDOMEN PELVIS W IV CONTRAST Additional Contrast? None    Result Date: 2/27/2023  EXAMINATION: CT OF THE ABDOMEN AND PELVIS WITH CONTRAST 2/27/2023 10:17 pm TECHNIQUE: CT of the abdomen and pelvis was performed with the administration of intravenous contrast. Multiplanar reformatted images are provided for review. Automated exposure control, iterative reconstruction, and/or weight based adjustment of the mA/kV was utilized to reduce the radiation dose to as low as reasonably achievable. COMPARISON: 10/13/2009 HISTORY: ORDERING SYSTEM PROVIDED HISTORY: nausea + weight loss TECHNOLOGIST PROVIDED HISTORY: Reason for exam:->nausea + weight loss Additional Contrast?->None Decision Support Exception - unselect if not a suspected or confirmed emergency medical condition->Emergency Medical Condition (MA) What reading provider will be dictating this exam?->CRC FINDINGS: Lower Chest: There are numerous pulmonary nodules within the visualized lung bases. A left lower lobe nodule measures 1.1 cm.   A peripheral right lower lobe nodule measures 1 cm. There is trace right pleural effusion. Organs: The liver, gallbladder, spleen, pancreas, adrenals, and right kidney are unremarkable. There is a small left renal cyst. GI/Bowel: There is no evidence of bowel obstruction. No evidence of abnormal bowel wall thickening or distension. There are scattered diverticula. The appendix is not confidently identified. However, there is no inflammatory change in the right lower quadrant to suggest acute appendicitis. Pelvis: The urinary bladder is largely decompressed. Leiomyomatous uterus noted. Peritoneum/Retroperitoneum: There is moderate ascites. There are extensive peritoneal soft tissue nodules with omental caking. No evidence of lymphadenopathy. Aorta is normal in caliber. Bones/Soft Tissues: There are numerous densely sclerotic lesions throughout the visualized osseous structures, favored to represent enostoses. Numerous pulmonary nodules within the visualized lung bases, concerning for pulmonary metastatic disease. Moderate ascites with extensive peritoneal soft tissue nodules and omental caking, compatible with the peritoneal metastatic disease. Leiomyomatous uterus. Densely sclerotic osseous lesions, overall increased in number and size from prior examination. These may represent bone islands. However, given findings above, osseous metastatic disease is not entirely excluded. XR CHEST PORTABLE    Result Date: 2/27/2023  EXAMINATION: ONE XRAY VIEW OF THE CHEST 2/27/2023 9:51 pm COMPARISON: 01/19/2017 HISTORY: ORDERING SYSTEM PROVIDED HISTORY: Shortness of breath TECHNOLOGIST PROVIDED HISTORY: Reason for exam:->Shortness of breath What reading provider will be dictating this exam?->CRC FINDINGS: The cardiomediastinal silhouette is within normal limits. There are scattered bilateral nodular opacities, measuring up to 9 mm. Streaky left basilar opacity, likely atelectasis. No pneumothorax or pleural effusion.      Bilateral nodular opacities, concerning for underlying pulmonary nodules. Recommend attention on subsequent CT abdomen/pelvis. Clinical correlation recommended. Consider further evaluation with dedicated CT chest, as indicated. ASSESSMENT/PLAN :  68-year-old female  - Thyroid disease, HTN, HLD   - Concern of metastatic disease.    - Worsening appetite, nausea, and generalized weakness. Reported vaginal bleeding in which she had an ultrasound recently and was told to follow-up with OB/GYN however was unable to do so due to transport issues. - afebrile and on room air, however tachycardic.    - Chest x-ray with bilateral nodular opacities concerning for underlying pulmonary nodules. No effusions or consolidation.    - CTAP with numerous pulmonary nodules within the visualized lung bases concerning for pulmonary metastatic disease. Moderate ascites with extensive peritoneal soft tissue nodules, compatible with peritoneal metastatic disease. Leiomyomatous uterus. These may represent bone islands, osseous metastatic disease is not entirely excluded. - Tumor markers with Ca 125 77.6, CEA 0.6.   -  CMP with GFR 55 and calcium 8.1. Troponin 16. LFTs unremarkable. CBC with Hgb 11.4, MCV 79.6. Neutrophils 7.65. Attending addendum:  CT scan of abdomen and pelvis reviewed. Patient with moderate ascites and extensive peritoneal soft tissue nodules with omental caking compatible with peritoneal carcinomatosis and thickened uterus. Patient likely with metastatic GYN primary possibly endometrial with suspicious metastatic lung nodules at the lung bases  CT chest will be done for completion of staging. Recommend GYN consult and pelvic exam for consideration of endometrial biopsy if suspicious findings noted otherwise CT-guided biopsy of omental nodule or pulmonary nodule at the discretion of the invasive radiologist will be planned for tissue diagnosis. Thank you for the consult, we will follow.   Patient seen and examined, note edited to reflect above.       STEPHANIE Lazaro - CNP  Electronically signed 2/28/2023 at 10:27 Sarah Oden MD

## 2023-02-28 NOTE — CARE COORDINATION
Spoke with patient, she is from home lives alone. Typically independent with all self care, she does not use assistive devices at home. One step to enter the home, ranch set up. She sees NP Lali Alaniz at [facility] in [place]. Her daughter, Winston Braden is B[facility] but she is considering making her cousin, Sarah Hutchison her HCPOA. She tells me that her daughters are both busy and she is not sure if they have time to be her emergency contact. She is discussing with cousin at bedside. Cousin provides transportation when needed. She plans to return home when released, has not had trouble with ambulating or self care prior to admission. She would be agreeable to homecare if needed, no preference on agency. Case Management Assessment  Initial Evaluation    Date/Time of Evaluation: 2/28/2023 2:41 PM  Assessment Completed by: Irma Nguyễn    If patient is discharged prior to next notation, then this note serves as note for discharge by case management. Patient Name: Willian Alfaro                   YOB: 1940  Diagnosis: Metastatic cancer to lung [facility]) [C78.00]  Pulmonary nodules [R91.8]  Other ascites [R18.8]  Metastatic malignant neoplasm, unspecified site [facility]) [C79.9]                   Date / Time: 2/27/2023  9:00 PM    Patient Admission Status: Inpatient   Readmission Risk (Low < 19, Mod (19-27), High > 27): Readmission Risk Score: 12.2    Current PCP: Mary Rojo, DO  PCP verified by CM? Yes    Chart Reviewed: Yes      History Provided by: Patient  Patient Orientation: Alert and Oriented    Patient Cognition: Alert    Hospitalization in the last 30 days (Readmission):  No    If yes, Readmission Assessment in CM Navigator will be completed.     Advance Directives:      Code Status: Full Code   Patient's Primary Decision Maker is: Legal Next of Kin      Discharge Planning:    Patient lives with:   Type of Home:    Primary Care Giver: Self  Patient Support Systems include: Children, Family Members   Current Financial resources:    Current community resources:    Current services prior to admission:              Current DME:              Type of Home Care services:       ADLS  Prior functional level: Independent in ADLs/IADLs  Current functional level: Independent in ADLs/IADLs    PT AM-PAC:   /24  OT AM-PAC:   /24    Family can provide assistance at DC: Yes  Would you like Case Management to discuss the discharge plan with any other family members/significant others, and if so, who? No  Plans to Return to Present Housing: Yes  Other Identified Issues/Barriers to RETURNING to current housing: possible weakness  Potential Assistance needed at discharge:              Potential DME:    Patient expects to discharge to:    Plan for transportation at discharge:      Financial    Payor: [facility] MEDICARE / Plan: [employer] DUAL COMPLETE / Product Type: *No Product type* /     Does insurance require precert for SNF: Yes    Potential assistance Purchasing Medications:    Meds-to-Beds request:        [id number] - [address] -  [phone] - F [phone]  [address] [place] [id number]  Phone: [phone] Fax: [phone]      Notes:    Factors facilitating achievement of predicted outcomes: Pleasant    Barriers to discharge: Limited family support    Additional Case Management Notes:     The Plan for Transition of Care is related to the following treatment goals of Metastatic cancer to lung (HCC) [C78.00]  Pulmonary nodules [R91.8]  Other ascites [R18.8]  Metastatic malignant neoplasm, unspecified site (HCC) [C79.9]    IF APPLICABLE: The Patient and/or patient representative Maraima and her family were provided with a choice of provider and agrees with the discharge plan. Freedom of choice list with basic dialogue that supports the patient's individualized plan of care/goals and shares the quality data associated with the providers was provided to: Mariama  [address] [facility]    Patient Representative Name:       The Patient and/or Patient Representative Agree with the Discharge Plan?   Yes    Genia [place]  Case Management Department  Ph:  Fax:

## 2023-02-28 NOTE — ED NOTES
Report called to [facility]. Patient placed in transport.       Claude Robert RN  02/28/23 0336

## 2023-02-28 NOTE — H&P
Hospitalist History & Physical      PCP: Shayna Wise DO    Date of Service: Pt seen/examined on 2/28/2023     Chief Complaint:  had concerns including Other (Patient states for he last week she has had a loss of appetitie. She has been forcing herself to drink broth and water. Denies abdominal pain and vomiting.). History Of Present Illness:    Ms. Francois Fischer, a 78y.o. year old female  who  has a past medical history of Hyperlipidemia, Hypertension, and Thyroid disease. Patient presented to the emergency department with concerns about decreased appetite and weight loss. These have been present and progressing since 2019. She denies any abdominal pain, or vomiting but has been nauseous with eating. Also reported she has had some vaginal spotting. Vital signs show the patient to be tachycardic with a rate of 103. The patient is afebrile. Remainder of vital signs within normal limits and stable. Laboratory studies were unremarkable. CT abdomen pelvis shows numerous pulmonary nodules within the visualized lung bases concerning for pulmonary metastatic disease. Moderate ascites and extensive peritoneal soft tissue nodules and omental caking compatible with the peritoneal metastatic disease. Past Medical History:   Diagnosis Date    Hyperlipidemia     Hypertension     Thyroid disease        Past Surgical History:   Procedure Laterality Date    BREAST SURGERY      THYROIDECTOMY         Prior to Admission medications    Medication Sig Start Date End Date Taking?  Authorizing Provider   ferrous sulfate (IRON 325) 325 (65 Fe) MG tablet Take 325 mg by mouth daily (with breakfast)   Yes Historical Provider, MD   calcium carbonate 600 MG TABS tablet Take 1 tablet by mouth daily   Yes Historical Provider, MD   Cholecalciferol (VITAMIN D3) 50 MCG (2000 UT) CAPS Take by mouth   Yes Historical Provider, MD   aspirin 81 MG EC tablet Take 81 mg by mouth daily   Yes Historical Provider, MD simvastatin (ZOCOR) 20 MG tablet  1/17/23   Historical Provider, MD   Multiple Vitamin (MULTIVITAMIN) tablet take 1 tablet by mouth once daily 11/29/22   Historical Provider, MD   alendronate (FOSAMAX) 70 MG tablet  1/17/23   Historical Provider, MD   levothyroxine (SYNTHROID) 75 MCG tablet  1/17/23   Historical Provider, MD   hydroCHLOROthiazide (HYDRODIURIL) 25 MG tablet Take 1 tablet by mouth daily 4/2/22   Helio Ramsey MD         Allergies:  Patient has no known allergies. Social History:    TOBACCO:   reports that she has never smoked. She has never used smokeless tobacco.  ETOH:   has no history on file for alcohol use. Family History:    Reviewed in detail and negative for DM, CAD, Cancer, CVA. Positive as follows\"  History reviewed. No pertinent family history. REVIEW OF SYSTEMS:   Pertinent positives as noted in the HPI. All other systems reviewed and negative. PHYSICAL EXAM:  BP (!) 138/94   Pulse (!) 103   Temp 98.3 °F (36.8 °C)   Resp 16   Ht 5' 2\" (1.575 m)   Wt 109 lb (49.4 kg)   SpO2 97%   BMI 19.94 kg/m²   General appearance: No apparent distress, appears stated age and cooperative. HEENT: Normal cephalic, atraumatic without obvious deformity. Pupils equal, round, and reactive to light. Extra ocular muscles intact. Conjunctivae/corneas clear. Neck: Supple, with full range of motion. No jugular venous distention. Trachea midline. Respiratory: CTA  Cardiovascular: Tachycardic  Abdomen: S/NT/ND  Musculoskeletal: No clubbing, cyanosis, edema of bilateral lower extremities. Brisk capillary refill. Skin: Normal skin color. No rashes or lesions. Neurologic:  Neurovascularly intact without any focal sensory/motor deficits.  Cranial nerves: II-XII intact, grossly non-focal.  Psychiatric: Alert and oriented, thought content appropriate, normal insight    Reviewed EKG and CXR personally      CBC:   Recent Labs     02/27/23  1415   WBC 11.5   RBC 5.34   HGB 14.2   HCT 40.9   MCV 76.6*   RDW 13.2        BMP:   Recent Labs     02/27/23  1415      K 4.1   CL 97*   CO2 27   BUN 20   CREATININE 1.1*     LFT:  Recent Labs     02/27/23  1415   PROT 6.7   ALKPHOS 60   ALT 5   AST 21   BILITOT 0.5   LIPASE 12*     CE:  No results for input(s): Gwyn Pears in the last 72 hours. PT/INR: No results for input(s): INR, APTT in the last 72 hours. BNP: No results for input(s): BNP in the last 72 hours. ESR: No results found for: SEDRATE  CRP: No results found for: CRP  D Dimer: No results found for: DDIMER   Folate and B12: No results found for: ILQSVOES38, No results found for: FOLATE  Lactic Acid:   Lab Results   Component Value Date    LACTA 1.9 02/27/2023     Thyroid Studies:   Lab Results   Component Value Date    TSH <0.004 (L) 01/12/2011       Oupatient labs:  Lab Results   Component Value Date    TSH <0.004 (L) 01/12/2011       Urinalysis:    Lab Results   Component Value Date/Time    NITRU Negative 02/27/2023 02:15 PM    [address]-5 02/27/2023 02:15 PM    BACTERIA FEW 02/27/2023 02:15 PM    RBCUA 2-5 02/27/2023 02:15 PM    BLOODU MODERATE 02/27/2023 02:15 PM    SPECGRAV >=1.030 02/27/2023 02:15 PM    GLUCOSEU Negative 02/27/2023 02:15 PM       Imaging:  CT ABDOMEN PELVIS W IV CONTRAST Additional Contrast? None    Result Date: 2/27/2023  EXAMINATION: CT OF THE ABDOMEN AND PELVIS WITH CONTRAST 2/27/2023 10:17 pm TECHNIQUE: CT of the abdomen and pelvis was performed with the administration of intravenous contrast. Multiplanar reformatted images are provided for review. Automated exposure control, iterative reconstruction, and/or weight based adjustment of the mA/kV was utilized to reduce the radiation dose to as low as reasonably achievable.  COMPARISON: 10/13/2009 HISTORY: ORDERING SYSTEM PROVIDED HISTORY: nausea + weight loss TECHNOLOGIST PROVIDED HISTORY: Reason for exam:->nausea + weight loss Additional Contrast?->None Decision Support Exception - unselect if not a suspected or confirmed emergency medical condition->Emergency Medical Condition (MA) What reading provider will be dictating this exam?->CRC FINDINGS: Lower Chest: There are numerous pulmonary nodules within the visualized lung bases. A left lower lobe nodule measures 1.1 cm. A peripheral right lower lobe nodule measures 1 cm. There is trace right pleural effusion. Organs: The liver, gallbladder, spleen, pancreas, adrenals, and right kidney are unremarkable. There is a small left renal cyst. GI/Bowel: There is no evidence of bowel obstruction. No evidence of abnormal bowel wall thickening or distension. There are scattered diverticula. The appendix is not confidently identified. However, there is no inflammatory change in the right lower quadrant to suggest acute appendicitis. Pelvis: The urinary bladder is largely decompressed. Leiomyomatous uterus noted. Peritoneum/Retroperitoneum: There is moderate ascites. There are extensive peritoneal soft tissue nodules with omental caking. No evidence of lymphadenopathy. Aorta is normal in caliber. Bones/Soft Tissues: There are numerous densely sclerotic lesions throughout the visualized osseous structures, favored to represent enostoses. Numerous pulmonary nodules within the visualized lung bases, concerning for pulmonary metastatic disease. Moderate ascites with extensive peritoneal soft tissue nodules and omental caking, compatible with the peritoneal metastatic disease. Leiomyomatous uterus. Densely sclerotic osseous lesions, overall increased in number and size from prior examination. These may represent bone islands. However, given findings above, osseous metastatic disease is not entirely excluded.      XR CHEST PORTABLE    Result Date: 2/27/2023  EXAMINATION: ONE XRAY VIEW OF THE CHEST 2/27/2023 9:51 pm COMPARISON: 01/19/2017 HISTORY: ORDERING SYSTEM PROVIDED HISTORY: Shortness of breath TECHNOLOGIST PROVIDED HISTORY: Reason for exam:->Shortness of breath What reading provider will be dictating this exam?->CRC FINDINGS: The cardiomediastinal silhouette is within normal limits. There are scattered bilateral nodular opacities, measuring up to 9 mm. Streaky left basilar opacity, likely atelectasis. No pneumothorax or pleural effusion. Bilateral nodular opacities, concerning for underlying pulmonary nodules. Recommend attention on subsequent CT abdomen/pelvis. Clinical correlation recommended. Consider further evaluation with dedicated CT chest, as indicated. ASSESSMENT:  -Metastatic disease unknown primary  -Unintentional weight loss  -Hypertension  -Hyperlipidemia  -Hypothyroidism  -Sinus tachycardia      PLAN:  -Admit to medicine  -Consult heme-onc  -Telemetry  -Continue home medications        Diet: No diet orders on file  Code Status: No Order  Surrogate decision maker confirmed with patient:   Extended Emergency Contact Information  Primary Emergency Contact: Lyly Corbett  Address: [address] [place] [address], [address] Phone: [phone]  Relation: Grandchild  Secondary Emergency Contact: Lyly Corbett  Address: [address] [place] [address], [address] Phone: [phone]  Relation: Brother/Sister    DVT Prophylaxis: []Lovenox []Heparin []PCD [] [address] []Encouraged ambulation  Disposition: []Med/Surg [] Intermediate [] ICU/CCU  Admit status: [] Observation [] Inpatient     +++++++++++++++++++++++++++++++++++++++++++++++++  Randall Mcduffie DO  +++++++++++++++++++++++++++++++++++++++++++++++++  NOTE: This report was transcribed using voice recognition software. Every effort was made to ensure accuracy; however, inadvertent computerized transcription errors may be present.

## 2023-03-01 ENCOUNTER — APPOINTMENT (OUTPATIENT)
Dept: CT IMAGING | Age: 83
DRG: 181 | End: 2023-03-01
Payer: MEDICARE

## 2023-03-01 PROBLEM — E44.0 MODERATE PROTEIN-CALORIE MALNUTRITION (HCC): Chronic | Status: ACTIVE | Noted: 2023-03-01

## 2023-03-01 LAB
ALBUMIN SERPL-MCNC: 2.9 G/DL (ref 3.5–5.2)
ALP BLD-CCNC: 45 U/L (ref 35–104)
ALT SERPL-CCNC: <5 U/L (ref 0–32)
ANION GAP SERPL CALCULATED.3IONS-SCNC: 12 MMOL/L (ref 7–16)
AST SERPL-CCNC: 19 U/L (ref 0–31)
BASOPHILS ABSOLUTE: 0.07 E9/L (ref 0–0.2)
BASOPHILS RELATIVE PERCENT: 0.8 % (ref 0–2)
BILIRUB SERPL-MCNC: 0.5 MG/DL (ref 0–1.2)
BUN BLDV-MCNC: 19 MG/DL (ref 6–23)
CALCIUM SERPL-MCNC: 8 MG/DL (ref 8.6–10.2)
CHLORIDE BLD-SCNC: 102 MMOL/L (ref 98–107)
CO2: 25 MMOL/L (ref 22–29)
CREAT SERPL-MCNC: 0.9 MG/DL (ref 0.5–1)
EOSINOPHILS ABSOLUTE: 0.09 E9/L (ref 0.05–0.5)
EOSINOPHILS RELATIVE PERCENT: 1 % (ref 0–6)
GFR SERPL CREATININE-BSD FRML MDRD: >60 ML/MIN/1.73
GLUCOSE BLD-MCNC: 82 MG/DL (ref 74–99)
HCT VFR BLD CALC: 33.8 % (ref 34–48)
HEMOGLOBIN: 11.6 G/DL (ref 11.5–15.5)
IMMATURE GRANULOCYTES #: 0.04 E9/L
IMMATURE GRANULOCYTES %: 0.5 % (ref 0–5)
LYMPHOCYTES ABSOLUTE: 1.38 E9/L (ref 1.5–4)
LYMPHOCYTES RELATIVE PERCENT: 15.6 % (ref 20–42)
MCH RBC QN AUTO: 27 PG (ref 26–35)
MCHC RBC AUTO-ENTMCNC: 34.3 % (ref 32–34.5)
MCV RBC AUTO: 78.6 FL (ref 80–99.9)
MONOCYTES ABSOLUTE: 0.74 E9/L (ref 0.1–0.95)
MONOCYTES RELATIVE PERCENT: 8.4 % (ref 2–12)
NEUTROPHILS ABSOLUTE: 6.54 E9/L (ref 1.8–7.3)
NEUTROPHILS RELATIVE PERCENT: 73.7 % (ref 43–80)
PDW BLD-RTO: 13.2 FL (ref 11.5–15)
PLATELET # BLD: 226 E9/L (ref 130–450)
PMV BLD AUTO: 11.4 FL (ref 7–12)
POTASSIUM REFLEX MAGNESIUM: 3.7 MMOL/L (ref 3.5–5)
RBC # BLD: 4.3 E12/L (ref 3.5–5.5)
SODIUM BLD-SCNC: 139 MMOL/L (ref 132–146)
TOTAL PROTEIN: 5.2 G/DL (ref 6.4–8.3)
WBC # BLD: 8.9 E9/L (ref 4.5–11.5)

## 2023-03-01 PROCEDURE — 71260 CT THORAX DX C+: CPT

## 2023-03-01 PROCEDURE — 6370000000 HC RX 637 (ALT 250 FOR IP): Performed by: FAMILY MEDICINE

## 2023-03-01 PROCEDURE — 85025 COMPLETE CBC W/AUTO DIFF WBC: CPT

## 2023-03-01 PROCEDURE — 36415 COLL VENOUS BLD VENIPUNCTURE: CPT

## 2023-03-01 PROCEDURE — 2140000000 HC CCU INTERMEDIATE R&B

## 2023-03-01 PROCEDURE — 2580000003 HC RX 258: Performed by: FAMILY MEDICINE

## 2023-03-01 PROCEDURE — 6360000004 HC RX CONTRAST MEDICATION: Performed by: RADIOLOGY

## 2023-03-01 PROCEDURE — 80053 COMPREHEN METABOLIC PANEL: CPT

## 2023-03-01 RX ORDER — SODIUM CHLORIDE 0.9 % (FLUSH) 0.9 %
10 SYRINGE (ML) INJECTION
Status: ACTIVE | OUTPATIENT
Start: 2023-03-01 | End: 2023-03-02

## 2023-03-01 RX ORDER — HYDROCODONE BITARTRATE AND ACETAMINOPHEN 5; 325 MG/1; MG/1
1 TABLET ORAL EVERY 6 HOURS PRN
Status: DISCONTINUED | OUTPATIENT
Start: 2023-03-01 | End: 2023-03-09 | Stop reason: HOSPADM

## 2023-03-01 RX ADMIN — SODIUM CHLORIDE, PRESERVATIVE FREE 10 ML: 5 INJECTION INTRAVENOUS at 09:38

## 2023-03-01 RX ADMIN — IOPAMIDOL 75 ML: 755 INJECTION, SOLUTION INTRAVENOUS at 07:37

## 2023-03-01 RX ADMIN — HYDROCHLOROTHIAZIDE 25 MG: 25 TABLET ORAL at 09:36

## 2023-03-01 RX ADMIN — ATORVASTATIN CALCIUM 10 MG: 10 TABLET, FILM COATED ORAL at 09:37

## 2023-03-01 RX ADMIN — LEVOTHYROXINE SODIUM 75 MCG: 0.07 TABLET ORAL at 09:36

## 2023-03-01 RX ADMIN — SODIUM CHLORIDE, PRESERVATIVE FREE 10 ML: 5 INJECTION INTRAVENOUS at 22:12

## 2023-03-01 NOTE — PROGRESS NOTES
[facility]  Hematology/Oncology  Consult      Patient Name: Epi Bernardo  YOB: 1940  PCP: Carlos Arndt DO   Referring Provider:      Reason for Consultation:   Chief Complaint   Patient presents with    Other     Patient states for he last week she has had a loss of appetitie. She has been forcing herself to drink broth and water. Denies abdominal pain and vomiting. Interval history: No new complaints. History of Present Illness: This is an 64-year-old female patient with a PMH of thyroid disease, HTN, HLD who presented to the ED for evaluation of worsening appetite, nausea, and generalized weakness. Patient is also reported vaginal bleeding in which she has had an ultrasound recently and was told to follow-up with OB/GYN however was unable to do so due to transport issues. Patient has been afebrile and on room air, however tachycardic. Chest x-ray with bilateral nodular opacities concerning for underlying pulmonary nodules. No effusions or consolidation. CTAP with numerous pulmonary nodules within the visualized lung bases concerning for pulmonary metastatic disease. Moderate ascites with extensive peritoneal soft tissue nodules, compatible with peritoneal metastatic disease. Leiomyomatous uterus. These may represent bone islands, osseous metastatic disease is not entirely excluded. Tumor markers with Ca 125 77.5, CEA 0.6. CMP with GFR 55 and calcium 8.1. Troponin 16. LFTs unremarkable. CBC with Hgb 11.4, MCV 79.6. Neutrophils 7.65. Consultation for concern of metastatic disease.       Diagnostic Data:     Past Medical History:   Diagnosis Date    Hyperlipidemia     Hypertension     Thyroid disease        Patient Active Problem List    Diagnosis Date Noted    Moderate protein-calorie malnutrition ([facility] 03/01/2023    Metastatic cancer to lung ([facility] [address] 02/28/2023        Past Surgical History:   Procedure Laterality Date    BREAST SURGERY      THYROIDECTOMY Family History  History reviewed. No pertinent family history. Social History    TOBACCO:   reports that she has never smoked. She has never used smokeless tobacco.  ETOH:   has no history on file for alcohol use. Home Medications  Prior to Admission medications    Medication Sig Start Date End Date Taking? Authorizing Provider   ferrous sulfate (IRON 325) 325 (65 Fe) MG tablet Take 325 mg by mouth at bedtime   Yes Historical Provider, MD   calcium carbonate 600 MG TABS tablet Take 1 tablet by mouth 2 times daily   Yes Historical Provider, MD   Cholecalciferol (VITAMIN D3) 50 MCG (2000 UT) CAPS Take 2,000 Units by mouth daily   Yes Historical Provider, MD   aspirin 81 MG EC tablet Take 81 mg by mouth daily   Yes Historical Provider, MD   simvastatin (ZOCOR) 20 MG tablet Take 20 mg by mouth nightly 1/17/23   Historical Provider, MD   Multiple Vitamin (MULTIVITAMIN) tablet Take 1 tablet by mouth at bedtime 11/29/22   Historical Provider, MD   alendronate (FOSAMAX) 70 MG tablet Take 70 mg by mouth every 7 days Given Friday 1/17/23   Historical Provider, MD   levothyroxine (SYNTHROID) 75 MCG tablet Take 75 mcg by mouth Daily 1/17/23   Historical Provider, MD   hydroCHLOROthiazide (HYDRODIURIL) 25 MG tablet Take 1 tablet by mouth daily 4/2/22   Heide Cuadra MD       Allergies  No Known Allergies    Review of Systems: Fatigue, weak, decreased appetite, weight loss. Objective  BP (!) 114/56   Pulse 90   Temp 98.4 °F (36.9 °C) (Temporal)   Resp 18   Ht 5' 2\" (1.575 m)   Wt 110 lb 15.4 oz (50.3 kg)   SpO2 100%   BMI 20.29 kg/m²     Physical Exam:     General: AAO to person, place, time, in no acute distress,   Head and neck : PERRLA, EOMI . Sclera non icteric. Oropharynx : Clear  Neck: no JVD,  no adenopathy  Heart: Regular rate and regular rhythm, tachycardic  Lungs: Clear to auscultation   Extremities: No edema,no cyanosis, no clubbing.    Abdomen: Soft, non-tender;no masses, no organomegaly  Skin:  No rash. Neurologic:Cranial nerves grossly intact. No focal motor or sensory deficits . Recent Laboratory Data-   Lab Results   Component Value Date    WBC 8.9 03/01/2023    HGB 11.6 03/01/2023    HCT 33.8 (L) 03/01/2023    MCV 78.6 (L) 03/01/2023     03/01/2023    LYMPHOPCT 15.6 (L) 03/01/2023    RBC 4.30 03/01/2023    MCH 27.0 03/01/2023    MCHC 34.3 03/01/2023    RDW 13.2 03/01/2023    NEUTOPHILPCT 73.7 03/01/2023    MONOPCT 8.4 03/01/2023    BASOPCT 0.8 03/01/2023    NEUTROABS 6.54 03/01/2023    LYMPHSABS 1.38 (L) 03/01/2023    MONOSABS 0.74 03/01/2023    EOSABS 0.09 03/01/2023    BASOSABS 0.07 03/01/2023       Lab Results   Component Value Date     03/01/2023    K 3.7 03/01/2023     03/01/2023    CO2 25 03/01/2023    BUN 19 03/01/2023    CREATININE 0.9 03/01/2023    GLUCOSE 82 03/01/2023    CALCIUM 8.0 (L) 03/01/2023    PROT 5.2 (L) 03/01/2023    LABALBU 2.9 (L) 03/01/2023    BILITOT 0.5 03/01/2023    ALKPHOS 45 03/01/2023    AST 19 03/01/2023    ALT <5 03/01/2023    LABGLOM >60 03/01/2023    GFRAA >60 01/19/2017       No results found for: IRON, TIBC, FERRITIN        Radiology-    CT ABDOMEN PELVIS W IV CONTRAST Additional Contrast? None    Result Date: 2/27/2023  EXAMINATION: CT OF THE ABDOMEN AND PELVIS WITH CONTRAST 2/27/2023 10:17 pm TECHNIQUE: CT of the abdomen and pelvis was performed with the administration of intravenous contrast. Multiplanar reformatted images are provided for review. Automated exposure control, iterative reconstruction, and/or weight based adjustment of the mA/kV was utilized to reduce the radiation dose to as low as reasonably achievable.  COMPARISON: 10/13/2009 HISTORY: ORDERING SYSTEM PROVIDED HISTORY: nausea + weight loss TECHNOLOGIST PROVIDED HISTORY: Reason for exam:->nausea + weight loss Additional Contrast?->None Decision Support Exception - unselect if not a suspected or confirmed emergency medical condition->Emergency Medical Condition (MA) What reading provider will be dictating this exam?->CRC FINDINGS: Lower Chest: There are numerous pulmonary nodules within the visualized lung bases. A left lower lobe nodule measures 1.1 cm. A peripheral right lower lobe nodule measures 1 cm. There is trace right pleural effusion. Organs: The liver, gallbladder, spleen, pancreas, adrenals, and right kidney are unremarkable. There is a small left renal cyst. GI/Bowel: There is no evidence of bowel obstruction. No evidence of abnormal bowel wall thickening or distension. There are scattered diverticula. The appendix is not confidently identified. However, there is no inflammatory change in the right lower quadrant to suggest acute appendicitis. Pelvis: The urinary bladder is largely decompressed. Leiomyomatous uterus noted. Peritoneum/Retroperitoneum: There is moderate ascites. There are extensive peritoneal soft tissue nodules with omental caking. No evidence of lymphadenopathy. Aorta is normal in caliber. Bones/Soft Tissues: There are numerous densely sclerotic lesions throughout the visualized osseous structures, favored to represent enostoses. Numerous pulmonary nodules within the visualized lung bases, concerning for pulmonary metastatic disease. Moderate ascites with extensive peritoneal soft tissue nodules and omental caking, compatible with the peritoneal metastatic disease. Leiomyomatous uterus. Densely sclerotic osseous lesions, overall increased in number and size from prior examination. These may represent bone islands. However, given findings above, osseous metastatic disease is not entirely excluded.      XR CHEST PORTABLE    Result Date: 2/27/2023  EXAMINATION: ONE XRAY VIEW OF THE CHEST 2/27/2023 9:51 pm COMPARISON: 01/19/2017 HISTORY: ORDERING SYSTEM PROVIDED HISTORY: Shortness of breath TECHNOLOGIST PROVIDED HISTORY: Reason for exam:->Shortness of breath What reading provider will be dictating this exam?->CRC FINDINGS: The cardiomediastinal silhouette is within normal limits. There are scattered bilateral nodular opacities, measuring up to 9 mm. Streaky left basilar opacity, likely atelectasis. No pneumothorax or pleural effusion. Bilateral nodular opacities, concerning for underlying pulmonary nodules. Recommend attention on subsequent CT abdomen/pelvis. Clinical correlation recommended. Consider further evaluation with dedicated CT chest, as indicated. ASSESSMENT/PLAN :  64-year-old female  - Thyroid disease, HTN, HLD   - Concern of metastatic disease.    - Worsening appetite, nausea, and generalized weakness. Reported vaginal bleeding in which she had an ultrasound recently and was told to follow-up with OB/GYN however was unable to do so due to transport issues. - afebrile and on room air, however tachycardic.    - Chest x-ray with bilateral nodular opacities concerning for underlying pulmonary nodules. No effusions or consolidation.    - CTAP with numerous pulmonary nodules within the visualized lung bases concerning for pulmonary metastatic disease. Moderate ascites with extensive peritoneal soft tissue nodules, compatible with peritoneal metastatic disease. Leiomyomatous uterus. These may represent bone islands, osseous metastatic disease is not entirely excluded. - Tumor markers with Ca 125 77.6, CEA 0.6.   -  CMP with GFR 55 and calcium 8.1. Troponin 16. LFTs unremarkable. CBC with Hgb 11.4, MCV 79.6. Neutrophils 7.65. Attending addendum:  CT scan of abdomen and pelvis reviewed. Patient with moderate ascites and extensive peritoneal soft tissue nodules with omental caking compatible with peritoneal carcinomatosis and thickened uterus. Patient likely with metastatic GYN primary possibly endometrial with suspicious metastatic lung nodules at the lung bases  CT chest will be done for completion of staging.   Recommend GYN consult and pelvic exam for consideration of endometrial biopsy if suspicious findings noted otherwise CT-guided biopsy of omental nodule or pulmonary nodule at the discretion of the invasive radiologist will be planned for tissue diagnosis. Thank you for the consult, we will follow. Patient seen and examined, note edited to reflect above. 3/1/23  - Likely metastatic GYN primary possibly endometrial with suspicious metastatic lung nodules at the lung bases. - CT A/P as above. - CT chest with multiple BL noncalcified pulmonary nodules. Moderate subcarinal adenopathy. Multiple sclerotic foci in the thoracic spine. Small R pleural effusion.  - GYN consulted for pelvic exam for consideration of endometrial biopsy however cannot be done while inpatient. Recommend IR guided biopsy of peritoneal mets/lung nodules per IR.  - Will have biopsy planned at the discretion of the invasive radiologist for tissue diagnosis. - We will follow. STEPHANIE Tineo - CNP  Electronically signed 3/1/2023 at 12:10 PM  Patient seen and examined. Agree with CNP assessment and plan. Note updated.   Mary Galarza MD

## 2023-03-01 NOTE — CONSULTS
Gyn Consultation    Received consultation. Chart reviewed. Unable to perform endometrial biopsies in hospital.  Would recommend CT guided biopsy of omentum or pulmonary nodule. Call out through [address] to discuss with Heme  Onc provider.

## 2023-03-01 NOTE — CONSULTS
Comprehensive Nutrition Assessment    Type and Reason for Visit:  Initial, Positive Nutrition Screen    Nutrition Recommendations/Plan:   Continue current diet  Start Ensure BID and magic cup once/day to promote oral intake  Will monitor     Malnutrition Assessment:  Malnutrition Status: Moderate malnutrition (03/01/23 1208)    Context:  Chronic Illness     Findings of the 6 clinical characteristics of malnutrition:  Energy Intake:  75% or less estimated energy requirements for 1 month or longer  Weight Loss:  Unable to assess (d/t lack of wt hx per EMR)     Body Fat Loss:  Mild body fat loss Orbital   Muscle Mass Loss:  Mild muscle mass loss Temples (temporalis), Clavicles (pectoralis & deltoids)  Fluid Accumulation:  No significant fluid accumulation     Strength:  Not Performed    Nutrition Assessment:    pt adm d/t lack of appetite for several weeks PTA (note pt reports poor appetite/inadequate oral intake since ~2019); PMhx of HTN, HLD; noted subjective wt loss d/t poor appetite; pt w/ metastatic lung CA; pt reports nausea with eating ; moderate ascites noted; pt meets criteria for moderate malnutrition; will start ONS to promote oral intake and monitor. Nutrition Related Findings:    DIMA I/O; A&Ox4; hypoactive BS; no edema Wound Type: None       Current Nutrition Intake & Therapies:    Average Meal Intake: Unable to assess (no intakes recorded yet this adm)  Average Supplements Intake: Unable to assess (no intakes recorded yet this adm)  ADULT DIET; Regular  ADULT ORAL NUTRITION SUPPLEMENT; Breakfast, Lunch; Standard High Calorie/High Protein Oral Supplement  ADULT ORAL NUTRITION SUPPLEMENT; Dinner; Frozen Oral Supplement    Anthropometric Measures:  Height: 5' 2\" (157.5 cm)  Ideal Body Weight (IBW): 110 lbs (50 kg)    Admission Body Weight: 110 lb 15.4 oz (50.3 kg) (3/1-BS ; first measured)  Current Body Weight: 110 lb 15.4 oz (50.3 kg) (3/1-BS), 100.9 % IBW.  Weight Source: Bed Scale  Current BMI (kg/m2): 20.3  Usual Body Weight:  (DIMA d/t lack of wt hx per EMR)     Weight Adjustment For: No Adjustment                 BMI Categories: Underweight (BMI less than 22) age over 72    Estimated Daily Nutrient Needs:  Energy Requirements Based On: Formula  Weight Used for Energy Requirements: Current  Energy (kcal/day): 8421-2405  Weight Used for Protein Requirements: Current  Protein (g/day): 1.5-1.7g/kgxCBW=75-85g  Method Used for Fluid Requirements: 1 ml/kcal  Fluid (ml/day): 9282-1383    Nutrition Diagnosis:   Moderate malnutrition, In context of chronic illness related to catabolic illness (CA ; poor appetite/intake PTA) as evidenced by Criteria as identified in malnutrition assessment    Nutrition Interventions:   Food and/or Nutrient Delivery: Continue Current Diet, Start Oral Nutrition Supplement (Ensure BID; magic cup once/day)  Nutrition Education/Counseling: No recommendation at this time  Coordination of Nutrition Care: Continue to monitor while inpatient       Goals:     Goals: PO intake 50% or greater       Nutrition Monitoring and Evaluation:   Behavioral-Environmental Outcomes: None Identified  Food/Nutrient Intake Outcomes: Food and Nutrient Intake, Supplement Intake  Physical Signs/Symptoms Outcomes: Biochemical Data, Nutrition Focused Physical Findings, Weight, Chewing or Swallowing, Skin, GI Status, Fluid Status or Edema, Nausea or Vomiting    Discharge Planning:     Too soon to determine     Travis Baer RD  Contact: [phone]

## 2023-03-01 NOTE — PLAN OF CARE
Problem: Discharge Planning  Goal: Discharge to home or other facility with appropriate resources  Outcome: Progressing  Flowsheets  Taken 3/1/2023 0037 by Debbie Rankin RN  Discharge to home or other facility with appropriate resources:   Identify barriers to discharge with patient and caregiver   Arrange for needed discharge resources and transportation as appropriate   Identify discharge learning needs (meds, wound care, etc)   Refer to discharge planning if patient needs post-hospital services based on physician order or complex needs related to functional status, cognitive ability or social support system  Taken 2/28/2023 2000 by Romel Veras RN  Discharge to home or other facility with appropriate resources: Identify barriers to discharge with patient and caregiver     Problem: ABCDS Injury Assessment  Goal: Absence of physical injury  Outcome: Progressing

## 2023-03-01 NOTE — PROGRESS NOTES
Attempted to call consult to OB/GYN via [email or web address] number. Office staff is unable to call consult. Attempted to Dr. Margie Bro from physician contact list. Message left with office staff to call unit. Office staff did not take patient name or information. Awaiting return call from Dr. Margie Bro.

## 2023-03-01 NOTE — PROGRESS NOTES
Hospitalist Progress Note      SYNOPSIS: Patient admitted on 2023 for Metastatic cancer to [facility]) PMH of thyroid disease, HTN, HLD who presented to the ED for evaluation of worsening appetite, nausea, and generalized weakness. Patient is also reported vaginal bleeding in which she has had an ultrasound recently and was told to follow-up with OB/GYN however was unable to do so due to transport issues. CTAP with numerous pulmonary nodules within the visualized lung bases concerning for pulmonary metastatic disease. Moderate ascites with extensive peritoneal soft tissue nodules, compatible with peritoneal metastatic disease. Leiomyomatous uterus. These may represent bone islands, osseous metastatic disease is not entirely excluded. Tumor markers with Ca 125 77.5, CEA 0.6. Heme onc following. SUBJECTIVE:  Stable overnight. No other overnight issues reported. Patient seen and examined  Records reviewed. Still with poor po intake  Denies any other complaints      Temp (24hrs), Av.4 °F (36.9 °C), Min:98.1 °F (36.7 °C), Max:98.6 °F (37 °C)    DIET: ADULT DIET; Regular  ADULT ORAL NUTRITION SUPPLEMENT; Breakfast, Lunch, Dinner; Standard High Calorie/High Protein Oral Supplement  CODE: Full Code  No intake or output data in the 24 hours ending 23 0857    Review of Systems  All bolded are positive; please see HPI  General:  Fever, chills, diaphoresis, fatigue, malaise, night sweats, weight loss decreased po intake  Psychological:  Anxiety, disorientation, hallucinations. ENT:  Epistaxis, headaches, vertigo, visual changes. Cardiovascular:  Chest pain, irregular heartbeats, palpitations, paroxysmal nocturnal dyspnea. Respiratory:  Shortness of breath, coughing, sputum production, hemoptysis, wheezing, orthopnea.   Gastrointestinal:  Nausea, vomiting, diarrhea, heartburn, constipation, abdominal pain, hematemesis, hematochezia, melena, acholic stools  Genito-Urinary:  Dysuria, urgency, frequency, hematuria  Musculoskeletal:  Joint pain, joint stiffness, joint swelling, muscle pain  Neurology:  Headache, focal neurological deficits, weakness, numbness, paresthesia  Derm:  Rashes, ulcers, excoriations, bruising  Extremities:  Decreased ROM, peripheral edema, mottling      OBJECTIVE:    /62   Pulse 71   Temp 98.1 °F (36.7 °C) (Oral)   Resp 19   Ht 5' 2\" (1.575 m)   Wt 109 lb (49.4 kg)   SpO2 100%   BMI 19.94 kg/m²     General appearance:  awake, alert, and oriented to person, place, time, and purpose; appears stated age and cooperative; no apparent distress no labored breathing  HEENT:  Conjunctivae/corneas clear. Neck: Supple. No jugular venous distention. Respiratory: symmetrical; clear to auscultation bilaterally; no wheezes; no rhonchi; no rales  Cardiovascular: rhythm regular; rate controlled; no murmurs  Abdomen: Soft, nontender, nondistended  Extremities:  peripheral pulses present; no peripheral edema; no ulcers  Musculoskeletal: No clubbing, cyanosis, no bilateral lower extremity edema. Brisk capillary refill. Skin:  No rashes  on visible skin  Neurologic: awake, alert and following commands     ASSESSMENT and PLAN:    Concern for metastatic disease- CTAP with numerous pulmonary nodules within the visualized lung bases concerning for pulmonary metastatic disease. Moderate ascites with extensive peritoneal soft tissue nodules, compatible with peritoneal metastatic disease. Ca 125 77.6. Oncology following. For CT chest for staging. Recommend GYN consult and pelvic exam for consideration of endometrial biopsy. Or, possible CT guided biopsy of omental nodule or pulmonary nodule. Holding aspirin/lovenox for possible procedure. Nausea/vomiting/decreased po intake- due to above. Dietitian. Hypertension- HCTZ  Hyperlipidemia- statin  Hypothyroidism- synthroid    Dispo: await GYN for possible endometrial biopsy vs CT guided biopsy.      Medications:  REVIEWED DAILY    Infusion Medications    sodium chloride       Scheduled Medications    aspirin  81 mg Oral Daily    ferrous sulfate  325 mg Oral Daily with breakfast    hydroCHLOROthiazide  25 mg Oral Daily    levothyroxine  75 mcg Oral Daily    atorvastatin  10 mg Oral Daily    sodium chloride flush  10 mL IntraVENous 2 times per day    [Held by provider] enoxaparin  30 mg SubCUTAneous Daily     PRN Meds: sodium chloride flush, sodium chloride flush, sodium chloride, promethazine **OR** ondansetron, polyethylene glycol, acetaminophen **OR** acetaminophen, calcium carbonate, hydrALAZINE, melatonin    Labs:     Recent Labs     02/27/23 1415 02/28/23  0602 03/01/23  0509   WBC 11.5 10.0 8.9   HGB 14.2 11.4* 11.6   HCT 40.9 33.6* 33.8*    243 226       Recent Labs     02/27/23 1415 02/28/23  0602 03/01/23  0509    140 139   K 4.1 3.8 3.7   CL 97* 102 102   CO2 27 25 25   BUN 20 22 19   CREATININE 1.1* 1.0 0.9   CALCIUM 9.2 8.1* 8.0*       Recent Labs     02/27/23 1415 02/28/23  0602 03/01/23  0509   PROT 6.7 5.1* 5.2*   ALKPHOS 60 44 45   ALT 5 5 <5   AST 21 17 19   BILITOT 0.5 0.4 0.5   LIPASE 12*  --   --        No results for input(s): INR in the last 72 hours. No results for input(s): Naomi Martinez in the last 72 hours. Chronic labs:    Lab Results   Component Value Date    TSH <0.004 (L) 01/12/2011       Radiology: REVIEWED DAILY    +++++++++++++++++++++++++++++++++++++++++++++++++  DO Devendra Best Physician - [address] [place], [place]  +++++++++++++++++++++++++++++++++++++++++++++++++  NOTE: This report was transcribed using voice recognition software. Every effort was made to ensure accuracy; however, inadvertent computerized transcription errors may be present.

## 2023-03-02 ENCOUNTER — APPOINTMENT (OUTPATIENT)
Dept: CT IMAGING | Age: 83
DRG: 181 | End: 2023-03-02
Payer: MEDICARE

## 2023-03-02 LAB
ALBUMIN SERPL-MCNC: 2.9 G/DL (ref 3.5–5.2)
ALP BLD-CCNC: 46 U/L (ref 35–104)
ALT SERPL-CCNC: 5 U/L (ref 0–32)
ANION GAP SERPL CALCULATED.3IONS-SCNC: 13 MMOL/L (ref 7–16)
AST SERPL-CCNC: 19 U/L (ref 0–31)
BASOPHILS ABSOLUTE: 0.05 E9/L (ref 0–0.2)
BASOPHILS RELATIVE PERCENT: 0.5 % (ref 0–2)
BILIRUB SERPL-MCNC: 0.4 MG/DL (ref 0–1.2)
BUN BLDV-MCNC: 17 MG/DL (ref 6–23)
CALCIUM SERPL-MCNC: 8.1 MG/DL (ref 8.6–10.2)
CHLORIDE BLD-SCNC: 97 MMOL/L (ref 98–107)
CO2: 26 MMOL/L (ref 22–29)
CREAT SERPL-MCNC: 0.9 MG/DL (ref 0.5–1)
EKG ATRIAL RATE: 118 BPM
EKG P AXIS: 74 DEGREES
EKG P-R INTERVAL: 116 MS
EKG Q-T INTERVAL: 334 MS
EKG QRS DURATION: 88 MS
EKG QTC CALCULATION (BAZETT): 468 MS
EKG R AXIS: -24 DEGREES
EKG T AXIS: 82 DEGREES
EKG VENTRICULAR RATE: 118 BPM
EOSINOPHILS ABSOLUTE: 0.14 E9/L (ref 0.05–0.5)
EOSINOPHILS RELATIVE PERCENT: 1.5 % (ref 0–6)
GFR SERPL CREATININE-BSD FRML MDRD: >60 ML/MIN/1.73
GLUCOSE BLD-MCNC: 80 MG/DL (ref 74–99)
HCT VFR BLD CALC: 34 % (ref 34–48)
HEMOGLOBIN: 11.8 G/DL (ref 11.5–15.5)
IMMATURE GRANULOCYTES #: 0.05 E9/L
IMMATURE GRANULOCYTES %: 0.5 % (ref 0–5)
INR BLD: 1.2
LYMPHOCYTES ABSOLUTE: 1.63 E9/L (ref 1.5–4)
LYMPHOCYTES RELATIVE PERCENT: 17.9 % (ref 20–42)
MCH RBC QN AUTO: 26 PG (ref 26–35)
MCHC RBC AUTO-ENTMCNC: 34.7 % (ref 32–34.5)
MCV RBC AUTO: 75.1 FL (ref 80–99.9)
MONOCYTES ABSOLUTE: 0.85 E9/L (ref 0.1–0.95)
MONOCYTES RELATIVE PERCENT: 9.3 % (ref 2–12)
NEUTROPHILS ABSOLUTE: 6.39 E9/L (ref 1.8–7.3)
NEUTROPHILS RELATIVE PERCENT: 70.3 % (ref 43–80)
PDW BLD-RTO: 13.2 FL (ref 11.5–15)
PLATELET # BLD: 237 E9/L (ref 130–450)
PMV BLD AUTO: 11.4 FL (ref 7–12)
POTASSIUM REFLEX MAGNESIUM: 3.7 MMOL/L (ref 3.5–5)
PROTHROMBIN TIME: 13.4 SEC (ref 9.3–12.4)
RBC # BLD: 4.53 E12/L (ref 3.5–5.5)
SODIUM BLD-SCNC: 136 MMOL/L (ref 132–146)
TOTAL PROTEIN: 5.2 G/DL (ref 6.4–8.3)
WBC # BLD: 9.1 E9/L (ref 4.5–11.5)

## 2023-03-02 PROCEDURE — 88342 IMHCHEM/IMCYTCHM 1ST ANTB: CPT

## 2023-03-02 PROCEDURE — 36415 COLL VENOUS BLD VENIPUNCTURE: CPT

## 2023-03-02 PROCEDURE — 80053 COMPREHEN METABOLIC PANEL: CPT

## 2023-03-02 PROCEDURE — 2140000000 HC CCU INTERMEDIATE R&B

## 2023-03-02 PROCEDURE — 2709999900 CT GUIDED NEEDLE PLACEMENT

## 2023-03-02 PROCEDURE — 85025 COMPLETE CBC W/AUTO DIFF WBC: CPT

## 2023-03-02 PROCEDURE — 49180 BIOPSY ABDOMINAL MASS: CPT

## 2023-03-02 PROCEDURE — 85610 PROTHROMBIN TIME: CPT

## 2023-03-02 PROCEDURE — 88305 TISSUE EXAM BY PATHOLOGIST: CPT

## 2023-03-02 PROCEDURE — 0DBU3ZX EXCISION OF OMENTUM, PERCUTANEOUS APPROACH, DIAGNOSTIC: ICD-10-PCS | Performed by: INTERNAL MEDICINE

## 2023-03-02 PROCEDURE — 6370000000 HC RX 637 (ALT 250 FOR IP): Performed by: FAMILY MEDICINE

## 2023-03-02 PROCEDURE — 88341 IMHCHEM/IMCYTCHM EA ADD ANTB: CPT

## 2023-03-02 PROCEDURE — 2580000003 HC RX 258: Performed by: FAMILY MEDICINE

## 2023-03-02 PROCEDURE — 2500000003 HC RX 250 WO HCPCS: Performed by: RADIOLOGY

## 2023-03-02 PROCEDURE — 6360000002 HC RX W HCPCS: Performed by: RADIOLOGY

## 2023-03-02 PROCEDURE — 93010 ELECTROCARDIOGRAM REPORT: CPT | Performed by: INTERNAL MEDICINE

## 2023-03-02 RX ORDER — MIDAZOLAM HYDROCHLORIDE 2 MG/2ML
INJECTION, SOLUTION INTRAMUSCULAR; INTRAVENOUS
Status: COMPLETED | OUTPATIENT
Start: 2023-03-02 | End: 2023-03-02

## 2023-03-02 RX ORDER — FENTANYL CITRATE 50 UG/ML
INJECTION, SOLUTION INTRAMUSCULAR; INTRAVENOUS
Status: COMPLETED | OUTPATIENT
Start: 2023-03-02 | End: 2023-03-02

## 2023-03-02 RX ORDER — LIDOCAINE HYDROCHLORIDE 20 MG/ML
INJECTION, SOLUTION INFILTRATION; PERINEURAL
Status: COMPLETED | OUTPATIENT
Start: 2023-03-02 | End: 2023-03-02

## 2023-03-02 RX ADMIN — ATORVASTATIN CALCIUM 10 MG: 10 TABLET, FILM COATED ORAL at 08:27

## 2023-03-02 RX ADMIN — SODIUM CHLORIDE, PRESERVATIVE FREE 10 ML: 5 INJECTION INTRAVENOUS at 08:27

## 2023-03-02 RX ADMIN — SODIUM CHLORIDE, PRESERVATIVE FREE 10 ML: 5 INJECTION INTRAVENOUS at 21:36

## 2023-03-02 RX ADMIN — LIDOCAINE HYDROCHLORIDE 10 ML: 20 INJECTION, SOLUTION INFILTRATION; PERINEURAL at 12:18

## 2023-03-02 RX ADMIN — HYDROCHLOROTHIAZIDE 25 MG: 25 TABLET ORAL at 08:27

## 2023-03-02 RX ADMIN — Medication 1 KIT: at 12:33

## 2023-03-02 RX ADMIN — FENTANYL CITRATE 50 MCG: 50 INJECTION, SOLUTION INTRAMUSCULAR; INTRAVENOUS at 12:02

## 2023-03-02 RX ADMIN — MIDAZOLAM HYDROCHLORIDE 1 MG: 1 INJECTION, SOLUTION INTRAMUSCULAR; INTRAVENOUS at 12:03

## 2023-03-02 NOTE — PROGRESS NOTES
Date: 3/2/2023       Patient Name: Angelica Noland  : 1940      MRN: [id number]    PT order received. Chart has been reviewed. PT evaluation will be on hold as she is off unit for testing at this time. Will continue to follow and complete evaluation at later time.      Josie Amaya, PT

## 2023-03-02 NOTE — PROGRESS NOTES
1243 Patient returned from procedure. Dressing checked, clean, dry, and intact. Patient stable. No s/s of complications noted or reported. Vitals will be checked q 15min, see flow sheets. 1255 patient eating and drinking well with no s/s of complications noted or reported. 1350 Patient sent back to floor at this time, site was checked with every set of vitals. Site clean dry and intact.

## 2023-03-02 NOTE — PROGRESS NOTES
Hospitalist Progress Note      SYNOPSIS: Patient admitted on 2023 for Metastatic cancer to [facility]) PMH of thyroid disease, HTN, HLD who presented to the ED for evaluation of worsening appetite, nausea, and generalized weakness. Patient is also reported vaginal bleeding in which she has had an ultrasound recently and was told to follow-up with OB/GYN however was unable to do so due to transport issues. CTAP with numerous pulmonary nodules within the visualized lung bases concerning for pulmonary metastatic disease. Moderate ascites with extensive peritoneal soft tissue nodules, compatible with peritoneal metastatic disease. Leiomyomatous uterus. These may represent bone islands, osseous metastatic disease is not entirely excluded. Tumor markers with Ca 125 77.5, CEA 0.6. Heme onc following. For CT guided biopsy      SUBJECTIVE:  Stable overnight. No other overnight issues reported. Records reviewed. For CT guided biopsy  Patient OOR for procedure      Temp (24hrs), Av.2 °F (36.8 °C), Min:97.9 °F (36.6 °C), Max:98.5 °F (36.9 °C)    DIET: Diet NPO  CODE: Full Code    Intake/Output Summary (Last 24 hours) at 3/2/2023 1144  Last data filed at 3/1/2023 2225  Gross per 24 hour   Intake 120 ml   Output 1 ml   Net 119 ml           OBJECTIVE:    /65   Pulse 97   Temp 97.9 °F (36.6 °C)   Resp 16   Ht 5' 2\" (1.575 m)   Wt 110 lb 15.4 oz (50.3 kg)   SpO2 97%   BMI 20.29 kg/m²       ASSESSMENT and PLAN:    Concern for metastatic disease- CTAP with numerous pulmonary nodules within the visualized lung bases concerning for pulmonary metastatic disease. Moderate ascites with extensive peritoneal soft tissue nodules, compatible with peritoneal metastatic disease. Ca 125 77.6. Oncology following. For CT guided biopsy today. Nausea/vomiting/decreased po intake- due to above. Dietitian.  PTOT  Hypertension- HCTZ  Hyperlipidemia- statin  Hypothyroidism- synthroid  Moderate malnutrition, POA    Dispo: for CT guided biopsy today. Needs PTOT     Medications:  REVIEWED DAILY    Infusion Medications    sodium chloride       Scheduled Medications    [Held by provider] aspirin  81 mg Oral Daily    ferrous sulfate  325 mg Oral Daily with breakfast    hydroCHLOROthiazide  25 mg Oral Daily    levothyroxine  75 mcg Oral Daily    atorvastatin  10 mg Oral Daily    sodium chloride flush  10 mL IntraVENous 2 times per day    [Held by provider] enoxaparin  30 mg SubCUTAneous Daily     PRN Meds: HYDROcodone 5 mg - acetaminophen, sodium chloride flush, sodium chloride, promethazine **OR** ondansetron, polyethylene glycol, acetaminophen **OR** acetaminophen, calcium carbonate, hydrALAZINE, melatonin    Labs:     Recent Labs     02/28/23  0602 03/01/23  0509 03/02/23  0520   WBC 10.0 8.9 9.1   HGB 11.4* 11.6 11.8   HCT 33.6* 33.8* 34.0    226 237       Recent Labs     02/28/23  0602 03/01/23  0509 03/02/23  0520    139 136   K 3.8 3.7 3.7    102 97*   CO2 25 25 26   BUN 22 19 17   CREATININE 1.0 0.9 0.9   CALCIUM 8.1* 8.0* 8.1*       Recent Labs     02/27/23  1415 02/28/23  0602 03/01/23  0509 03/02/23  0520   PROT 6.7 5.1* 5.2* 5.2*   ALKPHOS 60 44 45 46   ALT 5 5 <5 5   AST 21 17 19 19   BILITOT 0.5 0.4 0.5 0.4   LIPASE 12*  --   --   --        Recent Labs     03/02/23  0520   INR 1.2       No results for input(s): CKTOTAL, TROPONINI in the last 72 hours. Chronic labs:    Lab Results   Component Value Date    TSH <0.004 (L) 01/12/2011    INR 1.2 03/02/2023       Radiology: REVIEWED DAILY    +++++++++++++++++++++++++++++++++++++++++++++++++  DO Devendra Pierce Physician - [address], [address]  +++++++++++++++++++++++++++++++++++++++++++++++++  NOTE: This report was transcribed using voice recognition software. Every effort was made to ensure accuracy; however, inadvertent computerized transcription errors may be present.

## 2023-03-02 NOTE — PROGRESS NOTES
[facility]  Hematology/Oncology  Consult      Patient Name: Richardson Martinez  YOB: 1940  PCP: Trev Riley DO   Referring Provider:      Reason for Consultation:   Chief Complaint   Patient presents with    Other     Patient states for he last week she has had a loss of appetitie. She has been forcing herself to drink broth and water. Denies abdominal pain and vomiting. Interval history: No new issues, s/p lung biopsy today    History of Present Illness: This is an 78-year-old female patient with a PMH of thyroid disease, HTN, HLD who presented to the ED for evaluation of worsening appetite, nausea, and generalized weakness. Patient is also reported vaginal bleeding in which she has had an ultrasound recently and was told to follow-up with OB/GYN however was unable to do so due to transport issues. Patient has been afebrile and on room air, however tachycardic. Chest x-ray with bilateral nodular opacities concerning for underlying pulmonary nodules. No effusions or consolidation. CTAP with numerous pulmonary nodules within the visualized lung bases concerning for pulmonary metastatic disease. Moderate ascites with extensive peritoneal soft tissue nodules, compatible with peritoneal metastatic disease. Leiomyomatous uterus. These may represent bone islands, osseous metastatic disease is not entirely excluded. Tumor markers with Ca 125 77.5, CEA 0.6. CMP with GFR 55 and calcium 8.1. Troponin 16. LFTs unremarkable. CBC with Hgb 11.4, MCV 79.6. Neutrophils 7.65. Consultation for concern of metastatic disease.       Diagnostic Data:     Past Medical History:   Diagnosis Date    Hyperlipidemia     Hypertension     Thyroid disease        Patient Active Problem List    Diagnosis Date Noted    Moderate protein-calorie malnutrition ([address] 03/01/2023    Metastatic cancer to lung ([facility] [address] 02/28/2023        Past Surgical History:   Procedure Laterality Date    BREAST SURGERY THYROIDECTOMY         Family History  History reviewed. No pertinent family history. Social History    TOBACCO:   reports that she has never smoked. She has never used smokeless tobacco.  ETOH:   has no history on file for alcohol use. Home Medications  Prior to Admission medications    Medication Sig Start Date End Date Taking? Authorizing Provider   ferrous sulfate (IRON 325) 325 (65 Fe) MG tablet Take 325 mg by mouth at bedtime   Yes Historical Provider, MD   calcium carbonate 600 MG TABS tablet Take 1 tablet by mouth 2 times daily   Yes Historical Provider, MD   Cholecalciferol (VITAMIN D3) 50 MCG (2000 UT) CAPS Take 2,000 Units by mouth daily   Yes Historical Provider, MD   aspirin 81 MG EC tablet Take 81 mg by mouth daily   Yes Historical Provider, MD   simvastatin (ZOCOR) 20 MG tablet Take 20 mg by mouth nightly 1/17/23   Historical Provider, MD   Multiple Vitamin (MULTIVITAMIN) tablet Take 1 tablet by mouth at bedtime 11/29/22   Historical Provider, MD   alendronate (FOSAMAX) 70 MG tablet Take 70 mg by mouth every 7 days Given Friday 1/17/23   Historical Provider, MD   levothyroxine (SYNTHROID) 75 MCG tablet Take 75 mcg by mouth Daily 1/17/23   Historical Provider, MD   hydroCHLOROthiazide (HYDRODIURIL) 25 MG tablet Take 1 tablet by mouth daily 4/2/22   Allison Caraballo MD       Allergies  No Known Allergies    Review of Systems: Fatigue, weak, decreased appetite, weight loss. Objective  /71   Pulse 97   Temp 98.3 °F (36.8 °C) (Oral)   Resp 18   Ht 5' 2\" (1.575 m)   Wt 110 lb 15.4 oz (50.3 kg)   SpO2 97%   BMI 20.29 kg/m²     Physical Exam:     General: AAO to person, place, time, in no acute distress,   Head and neck : PERRLA, EOMI . Sclera non icteric. Oropharynx : Clear  Neck: no JVD,  no adenopathy  Heart: Regular rate and regular rhythm, tachycardic  Lungs: Clear to auscultation   Extremities: No edema,no cyanosis, no clubbing.    Abdomen: Soft, non-tender;no masses, no organomegaly  Skin:  No rash. Neurologic:Cranial nerves grossly intact. No focal motor or sensory deficits . Recent Laboratory Data-   Lab Results   Component Value Date    WBC 9.1 03/02/2023    HGB 11.8 03/02/2023    HCT 34.0 03/02/2023    MCV 75.1 (L) 03/02/2023     03/02/2023    LYMPHOPCT 17.9 (L) 03/02/2023    RBC 4.53 03/02/2023    MCH 26.0 03/02/2023    MCHC 34.7 (H) 03/02/2023    RDW 13.2 03/02/2023    NEUTOPHILPCT 70.3 03/02/2023    MONOPCT 9.3 03/02/2023    BASOPCT 0.5 03/02/2023    NEUTROABS 6.39 03/02/2023    LYMPHSABS 1.63 03/02/2023    MONOSABS 0.85 03/02/2023    EOSABS 0.14 03/02/2023    BASOSABS 0.05 03/02/2023       Lab Results   Component Value Date     03/02/2023    K 3.7 03/02/2023    CL 97 (L) 03/02/2023    CO2 26 03/02/2023    BUN 17 03/02/2023    CREATININE 0.9 03/02/2023    GLUCOSE 80 03/02/2023    CALCIUM 8.1 (L) 03/02/2023    PROT 5.2 (L) 03/02/2023    LABALBU 2.9 (L) 03/02/2023    BILITOT 0.4 03/02/2023    ALKPHOS 46 03/02/2023    AST 19 03/02/2023    ALT 5 03/02/2023    LABGLOM >60 03/02/2023    GFRAA >60 01/19/2017       No results found for: IRON, TIBC, FERRITIN        Radiology-    CT ABDOMEN PELVIS W IV CONTRAST Additional Contrast? None    Result Date: 2/27/2023  EXAMINATION: CT OF THE ABDOMEN AND PELVIS WITH CONTRAST 2/27/2023 10:17 pm TECHNIQUE: CT of the abdomen and pelvis was performed with the administration of intravenous contrast. Multiplanar reformatted images are provided for review. Automated exposure control, iterative reconstruction, and/or weight based adjustment of the mA/kV was utilized to reduce the radiation dose to as low as reasonably achievable.  COMPARISON: 10/13/2009 HISTORY: ORDERING SYSTEM PROVIDED HISTORY: nausea + weight loss TECHNOLOGIST PROVIDED HISTORY: Reason for exam:->nausea + weight loss Additional Contrast?->None Decision Support Exception - unselect if not a suspected or confirmed emergency medical condition->Emergency Medical Condition (MA) What reading provider will be dictating this exam?->CRC FINDINGS: Lower Chest: There are numerous pulmonary nodules within the visualized lung bases. A left lower lobe nodule measures 1.1 cm. A peripheral right lower lobe nodule measures 1 cm. There is trace right pleural effusion. Organs: The liver, gallbladder, spleen, pancreas, adrenals, and right kidney are unremarkable. There is a small left renal cyst. GI/Bowel: There is no evidence of bowel obstruction. No evidence of abnormal bowel wall thickening or distension. There are scattered diverticula. The appendix is not confidently identified. However, there is no inflammatory change in the right lower quadrant to suggest acute appendicitis. Pelvis: The urinary bladder is largely decompressed. Leiomyomatous uterus noted. Peritoneum/Retroperitoneum: There is moderate ascites. There are extensive peritoneal soft tissue nodules with omental caking. No evidence of lymphadenopathy. Aorta is normal in caliber. Bones/Soft Tissues: There are numerous densely sclerotic lesions throughout the visualized osseous structures, favored to represent enostoses. Numerous pulmonary nodules within the visualized lung bases, concerning for pulmonary metastatic disease. Moderate ascites with extensive peritoneal soft tissue nodules and omental caking, compatible with the peritoneal metastatic disease. Leiomyomatous uterus. Densely sclerotic osseous lesions, overall increased in number and size from prior examination. These may represent bone islands. However, given findings above, osseous metastatic disease is not entirely excluded.      XR CHEST PORTABLE    Result Date: 2/27/2023  EXAMINATION: ONE XRAY VIEW OF THE CHEST 2/27/2023 9:51 pm COMPARISON: 01/19/2017 HISTORY: ORDERING SYSTEM PROVIDED HISTORY: Shortness of breath TECHNOLOGIST PROVIDED HISTORY: Reason for exam:->Shortness of breath What reading provider will be dictating this exam?->CRC FINDINGS: The cardiomediastinal silhouette is within normal limits. There are scattered bilateral nodular opacities, measuring up to 9 mm. Streaky left basilar opacity, likely atelectasis. No pneumothorax or pleural effusion. Bilateral nodular opacities, concerning for underlying pulmonary nodules. Recommend attention on subsequent CT abdomen/pelvis. Clinical correlation recommended. Consider further evaluation with dedicated CT chest, as indicated. ASSESSMENT/PLAN :  78-year-old female  - Thyroid disease, HTN, HLD   - Concern of metastatic disease.    - Worsening appetite, nausea, and generalized weakness. Reported vaginal bleeding in which she had an ultrasound recently and was told to follow-up with OB/GYN however was unable to do so due to transport issues. - afebrile and on room air, however tachycardic.    - Chest x-ray with bilateral nodular opacities concerning for underlying pulmonary nodules. No effusions or consolidation.    - CTAP with numerous pulmonary nodules within the visualized lung bases concerning for pulmonary metastatic disease. Moderate ascites with extensive peritoneal soft tissue nodules, compatible with peritoneal metastatic disease. Leiomyomatous uterus. These may represent bone islands, osseous metastatic disease is not entirely excluded. - Tumor markers with Ca 125 77.6, CEA 0.6.   -  CMP with GFR 55 and calcium 8.1. Troponin 16. LFTs unremarkable. CBC with Hgb 11.4, MCV 79.6. Neutrophils 7.65. Attending addendum:  CT scan of abdomen and pelvis reviewed. Patient with moderate ascites and extensive peritoneal soft tissue nodules with omental caking compatible with peritoneal carcinomatosis and thickened uterus. Patient likely with metastatic GYN primary possibly endometrial with suspicious metastatic lung nodules at the lung bases  CT chest will be done for completion of staging.   Recommend GYN consult and pelvic exam for consideration of endometrial biopsy if suspicious findings noted otherwise CT-guided biopsy of omental nodule or pulmonary nodule at the discretion of the invasive radiologist will be planned for tissue diagnosis. Thank you for the consult, we will follow. Patient seen and examined, note edited to reflect above. 3/1/23  - Likely metastatic GYN primary possibly endometrial with suspicious metastatic lung nodules at the lung bases. - CT A/P as above. - CT chest with multiple BL noncalcified pulmonary nodules. Moderate subcarinal adenopathy. Multiple sclerotic foci in the thoracic spine. Small R pleural effusion.  - GYN consulted for pelvic exam for consideration of endometrial biopsy however cannot be done while inpatient. Recommend IR guided biopsy of peritoneal mets/lung nodules per IR.  - Will have biopsy planned at the discretion of the invasive radiologist for tissue diagnosis. - We will follow. 3/2/23  - Likely metastatic GYN primary possibly endometrial with suspicious metastatic lung nodules at the lung bases  - CT A/P and chest as above   - GYN consulted for pelvic exam for consideration of endometrial biopsy however cannot be done while inpatient. - IR guided biopsy of lung nodules completed today, will follow path  -  77.6  - We will follow. STEPHANIE Marion - CNP  Electronically signed 3/2/2023 at 9:58 AM  Patient seen and examined.  Note updated  Verónica Fagan MD

## 2023-03-02 NOTE — PROGRESS NOTES
OT SESSION ATTEMPT     Date:3/2/2023  Patient Name: Sahil Mauricio  MRN: [id number]  : 1940  Room: [address] [place] [address]     Occupational therapy orders received/chart review completed and OT session attempted this date:    [] unavailable due to other medical staff currently with pt   [] on hold, await MRI/ neurosurgical recommendations. [] on hold per nursing staff secondary to lab / radiology results    [] declined Occupational Therapy  this date due to ___. Benefits of participation in therapy reviewed with pt. [x] off unit   [] Other:     Will reattempt OT eval at a later time/date.     Nasreen Lose OTR/L; [id number]

## 2023-03-02 NOTE — PROCEDURES
Patient arrived via bed with to Radiology department for Lung biopsy. Allergies, home medications, H&P and fasting instructions reviewed with patient. Vital signs taken. IV in place, blood obtained, IV flushed and prn adapter attached. Procedural instructions given, questions answered, Dr Augusta Burns spoke with patient understanding expressed and consent signed. Patient given fluoroscopy education, no questions at this time.

## 2023-03-02 NOTE — PRE SEDATION
Sedation Pre-Procedure Note    Patient Name: Sourav Willis   YOB: 1940  Room/Bed: 3466/5011-U  Medical Record Number: [id number]  Date: 3/2/2023   Time: 12:50 PM       Indication:  78year old female with abdominal/omental and parenchymal lung masses. Consent: I have discussed with the patient and/or the patient representative the indication, alternatives, and the possible risks and/or complications of the planned procedure and the anesthesia methods. The patient and/or patient representative appear to understand and agree to proceed. Vital Signs:   Vitals:    03/02/23 1243   BP: 135/70   Pulse: 96   Resp: 18   Temp: 98.3 °F (36.8 °C)   SpO2: 94%       Past Medical History:   has a past medical history of Hyperlipidemia, Hypertension, and Thyroid disease. Past Surgical History:   has a past surgical history that includes Breast surgery and Thyroidectomy. Medications:   Scheduled Meds:    [Held by provider] aspirin  81 mg Oral Daily    ferrous sulfate  325 mg Oral Daily with breakfast    hydroCHLOROthiazide  25 mg Oral Daily    levothyroxine  75 mcg Oral Daily    atorvastatin  10 mg Oral Daily    sodium chloride flush  10 mL IntraVENous 2 times per day    [Held by provider] enoxaparin  30 mg SubCUTAneous Daily     Continuous Infusions:    sodium chloride       PRN Meds: HYDROcodone 5 mg - acetaminophen, sodium chloride flush, sodium chloride, promethazine **OR** ondansetron, polyethylene glycol, acetaminophen **OR** acetaminophen, calcium carbonate, hydrALAZINE, melatonin  Home Meds:   Prior to Admission medications    Medication Sig Start Date End Date Taking?  Authorizing Provider   ferrous sulfate (IRON 325) 325 (65 Fe) MG tablet Take 325 mg by mouth at bedtime   Yes Historical Provider, MD   calcium carbonate 600 MG TABS tablet Take 1 tablet by mouth 2 times daily   Yes Historical Provider, MD   Cholecalciferol (VITAMIN D3) 50 MCG (2000 UT) CAPS Take 2,000 Units by mouth daily   Yes Historical Provider, MD   aspirin 81 MG EC tablet Take 81 mg by mouth daily   Yes Historical Provider, MD   simvastatin (ZOCOR) 20 MG tablet Take 20 mg by mouth nightly 1/17/23   Historical Provider, MD   Multiple Vitamin (MULTIVITAMIN) tablet Take 1 tablet by mouth at bedtime 11/29/22   Historical Provider, MD   alendronate (FOSAMAX) 70 MG tablet Take 70 mg by mouth every 7 days Given Friday 1/17/23   Historical Provider, MD   levothyroxine (SYNTHROID) 75 MCG tablet Take 75 mcg by mouth Daily 1/17/23   Historical Provider, MD   hydroCHLOROthiazide (HYDRODIURIL) 25 MG tablet Take 1 tablet by mouth daily 4/2/22   Tez Talley MD     Coumadin Use Last 7 Days:  no  Antiplatelet drug therapy use last 7 days: yes - ASA  Other anticoagulant use last 7 days: no  Additional Medication Information:  NA      Pre-Sedation Documentation and Exam:   I have reviewed the patient's history and review of systems.     Mallampati Airway Assessment:  normal    Prior History of Anesthesia Complications:   none    ASA Classification:  Class 2 - A normal healthy patient with mild systemic disease    Sedation/ Anesthesia Plan:   intravenous sedation    Medications Planned:   midazolam (Versed) intravenously and fentanyl intravenously    Patient is an appropriate candidate for plan of sedation: yes    Electronically signed by Amanda Farfan MD on 3/2/2023 at 12:50 PM

## 2023-03-02 NOTE — CARE COORDINATION
3/2:  Update CM Note:  Pt presented to the Er for lack of appetite & weight loss. CT abd/pelvis shows multiple pulmonary nodules - concerns for pulmonary/peritoneal  metastatic disease. Pt went to IR today for CT guide bx of omental nodule/pulmonary nodule for tissue dx. Hematology is following. PT/OT pending. Per previous notes:  Pt dc plan is to return home & cousin can transport. Pt is open to [place] [id number] if needed & has no preferences. Sw/CM will continue to follow for dc planning.  Electronically signed by Bianca Martinez RN on 3/2/2023 at 2:45 PM

## 2023-03-03 LAB
ALBUMIN SERPL-MCNC: 3 G/DL (ref 3.5–5.2)
ALP BLD-CCNC: 48 U/L (ref 35–104)
ALT SERPL-CCNC: 5 U/L (ref 0–32)
ANION GAP SERPL CALCULATED.3IONS-SCNC: 12 MMOL/L (ref 7–16)
AST SERPL-CCNC: 20 U/L (ref 0–31)
BASOPHILS ABSOLUTE: 0.05 E9/L (ref 0–0.2)
BASOPHILS RELATIVE PERCENT: 0.6 % (ref 0–2)
BILIRUB SERPL-MCNC: 0.5 MG/DL (ref 0–1.2)
BUN BLDV-MCNC: 18 MG/DL (ref 6–23)
CALCIUM SERPL-MCNC: 8.1 MG/DL (ref 8.6–10.2)
CHLORIDE BLD-SCNC: 99 MMOL/L (ref 98–107)
CO2: 26 MMOL/L (ref 22–29)
CREAT SERPL-MCNC: 0.8 MG/DL (ref 0.5–1)
EOSINOPHILS ABSOLUTE: 0.16 E9/L (ref 0.05–0.5)
EOSINOPHILS RELATIVE PERCENT: 2.1 % (ref 0–6)
GFR SERPL CREATININE-BSD FRML MDRD: >60 ML/MIN/1.73
GLUCOSE BLD-MCNC: 83 MG/DL (ref 74–99)
HCT VFR BLD CALC: 35.7 % (ref 34–48)
HEMOGLOBIN: 12.5 G/DL (ref 11.5–15.5)
IMMATURE GRANULOCYTES #: 0.05 E9/L
IMMATURE GRANULOCYTES %: 0.6 % (ref 0–5)
LYMPHOCYTES ABSOLUTE: 1.41 E9/L (ref 1.5–4)
LYMPHOCYTES RELATIVE PERCENT: 18.2 % (ref 20–42)
MCH RBC QN AUTO: 26.2 PG (ref 26–35)
MCHC RBC AUTO-ENTMCNC: 35 % (ref 32–34.5)
MCV RBC AUTO: 74.7 FL (ref 80–99.9)
MONOCYTES ABSOLUTE: 0.78 E9/L (ref 0.1–0.95)
MONOCYTES RELATIVE PERCENT: 10.1 % (ref 2–12)
NEUTROPHILS ABSOLUTE: 5.28 E9/L (ref 1.8–7.3)
NEUTROPHILS RELATIVE PERCENT: 68.4 % (ref 43–80)
PDW BLD-RTO: 13.2 FL (ref 11.5–15)
PLATELET # BLD: 261 E9/L (ref 130–450)
PMV BLD AUTO: 11.8 FL (ref 7–12)
POTASSIUM REFLEX MAGNESIUM: 3.8 MMOL/L (ref 3.5–5)
RBC # BLD: 4.78 E12/L (ref 3.5–5.5)
SODIUM BLD-SCNC: 137 MMOL/L (ref 132–146)
TOTAL PROTEIN: 5.4 G/DL (ref 6.4–8.3)
WBC # BLD: 7.7 E9/L (ref 4.5–11.5)

## 2023-03-03 PROCEDURE — 97530 THERAPEUTIC ACTIVITIES: CPT

## 2023-03-03 PROCEDURE — 97535 SELF CARE MNGMENT TRAINING: CPT

## 2023-03-03 PROCEDURE — 6370000000 HC RX 637 (ALT 250 FOR IP): Performed by: FAMILY MEDICINE

## 2023-03-03 PROCEDURE — 2580000003 HC RX 258: Performed by: INTERNAL MEDICINE

## 2023-03-03 PROCEDURE — 97161 PT EVAL LOW COMPLEX 20 MIN: CPT

## 2023-03-03 PROCEDURE — 36415 COLL VENOUS BLD VENIPUNCTURE: CPT

## 2023-03-03 PROCEDURE — 80053 COMPREHEN METABOLIC PANEL: CPT

## 2023-03-03 PROCEDURE — 2580000003 HC RX 258: Performed by: FAMILY MEDICINE

## 2023-03-03 PROCEDURE — 85025 COMPLETE CBC W/AUTO DIFF WBC: CPT

## 2023-03-03 PROCEDURE — 2140000000 HC CCU INTERMEDIATE R&B

## 2023-03-03 PROCEDURE — 97165 OT EVAL LOW COMPLEX 30 MIN: CPT

## 2023-03-03 RX ORDER — ONDANSETRON 4 MG/1
4 TABLET, ORALLY DISINTEGRATING ORAL 3 TIMES DAILY PRN
Qty: 60 TABLET | Refills: 0 | Status: SHIPPED | OUTPATIENT
Start: 2023-03-03

## 2023-03-03 RX ORDER — SODIUM CHLORIDE 9 MG/ML
INJECTION, SOLUTION INTRAVENOUS CONTINUOUS
Status: DISCONTINUED | OUTPATIENT
Start: 2023-03-03 | End: 2023-03-04

## 2023-03-03 RX ADMIN — LEVOTHYROXINE SODIUM 75 MCG: 0.07 TABLET ORAL at 06:58

## 2023-03-03 RX ADMIN — POLYETHYLENE GLYCOL 3350 17 G: 17 POWDER, FOR SOLUTION ORAL at 10:36

## 2023-03-03 RX ADMIN — FERROUS SULFATE TAB 325 MG (65 MG ELEMENTAL FE) 325 MG: 325 (65 FE) TAB at 09:05

## 2023-03-03 RX ADMIN — ATORVASTATIN CALCIUM 10 MG: 10 TABLET, FILM COATED ORAL at 09:04

## 2023-03-03 RX ADMIN — SODIUM CHLORIDE, PRESERVATIVE FREE 10 ML: 5 INJECTION INTRAVENOUS at 09:06

## 2023-03-03 RX ADMIN — SODIUM CHLORIDE: 9 INJECTION, SOLUTION INTRAVENOUS at 13:50

## 2023-03-03 RX ADMIN — HYDROCHLOROTHIAZIDE 25 MG: 25 TABLET ORAL at 09:05

## 2023-03-03 NOTE — PROGRESS NOTES
OCCUPATIONAL THERAPY INITIAL EVALUATION    [address] [address] [place]      Date:3/3/2023                                                  Patient Name: Sourav Willis  MRN: [id number]  : 1940  Room: [address] [place] [address]    Evaluating OT: Torey Lai MOT, OTR/L [id number]  Referring Harry Randolph DO  Specific Provider Orders: OT eval and treat 3/2  Recommended Adaptive Equipment: 24 hr assist     Diagnosis: lung CA   Surgery: none   Pertinent Medical History: HTN, HLD, thyroid disease   Precautions:  Fall Risk, syncope    Assessment of current deficits   [x] Functional mobility  [x]ADLs  [x] Strength               [x]Cognition   [x] Functional transfers   [x] IADLs         [x] Safety Awareness   [x]Endurance   [] Fine Coordination              [x] Balance      [] Vision/perception   [x]Sensation    []Gross Motor Coordination  [] ROM  [] Delirium                   [] Motor Control     OT PLAN OF CARE   OT POC based on physician orders, patient diagnosis and results of clinical assessment    Frequency/Duration: 2-4 days/wk for 2 weeks PRN   Specific OT Treatment to include:   * Instruction/training on adapted ADL techniques and AE recommendations to increase functional independence within precautions       * Training on energy conservation strategies, correct breathing pattern and techniques to improve independence/tolerance for self-care routine  * Functional transfer/mobility training/DME recommendations for increased independence, safety, and fall prevention  * Patient/Family education to increase follow through with safety techniques and functional independence  * Recommendation of environmental modifications for increased safety with functional transfers/mobility and ADLs  * Therapeutic exercise to improve motor endurance, ROM, and functional strength for ADLs/functional transfers  * Therapeutic activities to facilitate/challenge dynamic balance, stand tolerance for increased safety and independence with ADLs  * Neuro-muscular re-education: facilitation of righting/equilibrium reactions, midline orientation, scapular stability/mobility, normalization of muscle tone, and facilitation of volitional active controled movement    Home Living: Pt lives alone in a 1 story home; bed/bath on main level   Bathroom setup: tub shower unit   Equipment owned: shower chair, BSC, cane, w/c  Prior Level of Function: IND with ADLs/IADLs; using ww for functional mobility     Pain Level: 0/10  Cognition: A&O: 3/4; Follows multi step directions    Memory:  good    Sequencing:  good    Problem solving:  fair    Judgement/safety:  fair     Functional Assessment:  AM-PAC Daily Activity Raw Score: 19/24   Initial Eval Status  Date: 3/3/23 Treatment Status  Date: STGs=LTGs  Time Frame: 10-14 days   Feeding IND      Grooming SBA (seated)   IND   UB Dressing SBA   IND   LB Dressing SBA (pt crossed BLEs to doff/don B socks)  IND    Bathing SBA (simulated)  SUP    Toileting SBA  IND   Bed Mobility  Log roll: NT  Supine to sit: NT   Sit to supine: NT   Log roll IND  Supine to sit: IND   Sit to supine: IND   Functional Transfers Sit to stand:SBA   Stand to sit:SBA  Commode: SBA  IND   Functional Mobility SBA (using ww, to/from bathroom)  IND   Balance Sitting: SBA  Standing: SBA     Activity Tolerance fair     Visual/  Perceptual Glasses: yes              UE ROM: RUE:  WFL  LUE:  WFL  Strength: RUE: grossly 4/5 LUE: grossly 4/5   Strength: B WFL  Fine Motor Coordination:  WFL     Hearing: WFL  Sensation:  No c/o numbness/tingling   Tone:  WFL  Edema: none noted                            Comments:Cleared by RN to see pt. Upon arrival, patient sitting in chair and agreeable to OT session. At end of session, patient supine in bed with call light and phone within reach, all lines and tubes intact. Pt would benefit from continued OT to increase functional independence and quality of life.     Treatment: Pt required vc's and physical assist for proper technique/safety with hand placement/body mechanics/posture for bed mobility/ADLs/functional tranfers/mobility/ww management. Pt required vc's for sequencing/initiation of ADLs/functional transfers. After mobility while pt was sitting in chair, she appeared to go unresponsive for ~15 secs. She did not respond to name and her eyes were rolling back. RN made aware urgently. Pt came back to consciousness and vitals were measured. BP while sitting was 102/56 and HR was 56. After ~5 mins, BP re-measured and was 136/64 and HR was 75. Pt required rest breaks during session. Pt appeared to have tolerated session fairly and appears cooperative/pleasant . Pt instructed on use of call light for assistance and fall prevention. Pt demo'ing fair understanding of education provided. Continue to educate. Eval Complexity: Low    Rehab Potential: Good for established goals, pt. assisted in establishment of goals. LTG: maximize independence with ADLs to return to PLOF    Patient  instructed on diagnosis, prognosis/goals and plan of care. Demonstrated fair understanding. [] Malnutrition indicators have been identified and nursing has been notified to ensure a dietitian consult is ordered. Evaluation time includes thorough review of current medical information, gathering information on past medical & social history & PLOF, completion of standardized testing, informal observation of tasks, consultation with other medical professions/disciplines, assessment of data & development of POC/goals.      Time In: 0940       Time Out: 1005     Total treatment time: 15       Treatment Charges: Mins Units   OT Eval Low 33211 X    OT Eval Medium 06483     OT Eval High 79864     OT Re-Eval 48898     Ther Ex  67379       Manual Therapy 48263       Thera Activities 72297       ADL/Home Mgt 73360 15 1   Neuro Re-ed 01345       Group Therapy        Orthotic manage/training  29715       Non-Billable Time           Gary Gutierrez, OTR/L [id number]

## 2023-03-03 NOTE — PROGRESS NOTES
CLINICAL PHARMACY NOTE: MEDS TO BEDS    Total # of Prescriptions Filled: 1   The following medications were delivered to the patient:  Ondansetron 4mg    Additional Documentation:  Delivered to pt

## 2023-03-03 NOTE — PROGRESS NOTES
[facility]  Hematology/Oncology  Consult      Patient Name: Albino Avila  YOB: 1940  PCP: Aminata Holloway DO   Referring Provider:      Reason for Consultation:   Chief Complaint   Patient presents with    Other     Patient states for he last week she has had a loss of appetitie. She has been forcing herself to drink broth and water. Denies abdominal pain and vomiting. Interval history: S/p lung biopsy yesterday. Near syncopal episode working with PT today. History of Present Illness: This is an 78-year-old female patient with a PMH of thyroid disease, HTN, HLD who presented to the ED for evaluation of worsening appetite, nausea, and generalized weakness. Patient is also reported vaginal bleeding in which she has had an ultrasound recently and was told to follow-up with OB/GYN however was unable to do so due to transport issues. Patient has been afebrile and on room air, however tachycardic. Chest x-ray with bilateral nodular opacities concerning for underlying pulmonary nodules. No effusions or consolidation. CTAP with numerous pulmonary nodules within the visualized lung bases concerning for pulmonary metastatic disease. Moderate ascites with extensive peritoneal soft tissue nodules, compatible with peritoneal metastatic disease. Leiomyomatous uterus. These may represent bone islands, osseous metastatic disease is not entirely excluded. Tumor markers with Ca 125 77.5, CEA 0.6. CMP with GFR 55 and calcium 8.1. Troponin 16. LFTs unremarkable. CBC with Hgb 11.4, MCV 79.6. Neutrophils 7.65. Consultation for concern of metastatic disease.       Diagnostic Data:     Past Medical History:   Diagnosis Date    Hyperlipidemia     Hypertension     Thyroid disease        Patient Active Problem List    Diagnosis Date Noted    Moderate protein-calorie malnutrition ([facility] [address] 03/01/2023    Metastatic cancer to lung ([facility] [address] 02/28/2023        Past Surgical History:   Procedure Laterality Date    BREAST SURGERY      CT BIOPSY ABDOMEN RETROPERITONEUM  3/2/2023    CT BIOPSY ABDOMEN RETROPERITONEUM 3/2/2023 Justin Chapin MD [facility]    THYROIDECTOMY         Family History  History reviewed. No pertinent family history. Social History    TOBACCO:   reports that she has never smoked. She has never used smokeless tobacco.  ETOH:   has no history on file for alcohol use. Home Medications  Prior to Admission medications    Medication Sig Start Date End Date Taking? Authorizing Provider   ondansetron (ZOFRAN-ODT) 4 MG disintegrating tablet Take 1 tablet by mouth 3 times daily as needed for Nausea or Vomiting 3/3/23  Yes Dane Reed DO   ferrous sulfate (IRON 325) 325 (65 Fe) MG tablet Take 325 mg by mouth at bedtime   Yes Historical Provider, MD   calcium carbonate 600 MG TABS tablet Take 1 tablet by mouth 2 times daily   Yes Historical Provider, MD   Cholecalciferol (VITAMIN D3) 50 MCG (2000 UT) CAPS Take 2,000 Units by mouth daily   Yes Historical Provider, MD   aspirin 81 MG EC tablet Take 81 mg by mouth daily   Yes Historical Provider, MD   simvastatin (ZOCOR) 20 MG tablet Take 20 mg by mouth nightly 1/17/23   Historical Provider, MD   Multiple Vitamin (MULTIVITAMIN) tablet Take 1 tablet by mouth at bedtime 11/29/22   Historical Provider, MD   alendronate (FOSAMAX) 70 MG tablet Take 70 mg by mouth every 7 days Given Friday 1/17/23   Historical Provider, MD   levothyroxine (SYNTHROID) 75 MCG tablet Take 75 mcg by mouth Daily 1/17/23   Historical Provider, MD   hydroCHLOROthiazide (HYDRODIURIL) 25 MG tablet Take 1 tablet by mouth daily 4/2/22   Heide Cuadra MD       Allergies  No Known Allergies    Review of Systems: Fatigue, weak, decreased appetite, weight loss.        Objective  /74   Pulse 97   Temp 98.3 °F (36.8 °C) (Oral)   Resp 20   Ht 5' 2\" (1.575 m)   Wt 110 lb 15.4 oz (50.3 kg)   SpO2 96%   BMI 20.29 kg/m²     Physical Exam:     General: AAO to person, place, time, in no acute distress,   Head and neck : PERRLA, EOMI . Sclera non icteric. Oropharynx : Clear  Neck: no JVD,  no adenopathy  Heart: Regular rate and regular rhythm, tachycardic  Lungs: Clear to auscultation   Extremities: No edema,no cyanosis, no clubbing. Abdomen: Soft, non-tender;no masses, no organomegaly  Skin:  No rash. Neurologic:Cranial nerves grossly intact. No focal motor or sensory deficits . Recent Laboratory Data-   Lab Results   Component Value Date    WBC 7.7 03/03/2023    HGB 12.5 03/03/2023    HCT 35.7 03/03/2023    MCV 74.7 (L) 03/03/2023     03/03/2023    LYMPHOPCT 18.2 (L) 03/03/2023    RBC 4.78 03/03/2023    MCH 26.2 03/03/2023    MCHC 35.0 (H) 03/03/2023    RDW 13.2 03/03/2023    NEUTOPHILPCT 68.4 03/03/2023    MONOPCT 10.1 03/03/2023    BASOPCT 0.6 03/03/2023    NEUTROABS 5.28 03/03/2023    LYMPHSABS 1.41 (L) 03/03/2023    MONOSABS 0.78 03/03/2023    EOSABS 0.16 03/03/2023    BASOSABS 0.05 03/03/2023       Lab Results   Component Value Date     03/03/2023    K 3.8 03/03/2023    CL 99 03/03/2023    CO2 26 03/03/2023    BUN 18 03/03/2023    CREATININE 0.8 03/03/2023    GLUCOSE 83 03/03/2023    CALCIUM 8.1 (L) 03/03/2023    PROT 5.4 (L) 03/03/2023    LABALBU 3.0 (L) 03/03/2023    BILITOT 0.5 03/03/2023    ALKPHOS 48 03/03/2023    AST 20 03/03/2023    ALT 5 03/03/2023    LABGLOM >60 03/03/2023    GFRAA >60 01/19/2017       No results found for: IRON, TIBC, FERRITIN        Radiology-    CT ABDOMEN PELVIS W IV CONTRAST Additional Contrast? None    Result Date: 2/27/2023  EXAMINATION: CT OF THE ABDOMEN AND PELVIS WITH CONTRAST 2/27/2023 10:17 pm TECHNIQUE: CT of the abdomen and pelvis was performed with the administration of intravenous contrast. Multiplanar reformatted images are provided for review. Automated exposure control, iterative reconstruction, and/or weight based adjustment of the mA/kV was utilized to reduce the radiation dose to as low as reasonably achievable. COMPARISON: 10/13/2009 HISTORY: ORDERING SYSTEM PROVIDED HISTORY: nausea + weight loss TECHNOLOGIST PROVIDED HISTORY: Reason for exam:->nausea + weight loss Additional Contrast?->None Decision Support Exception - unselect if not a suspected or confirmed emergency medical condition->Emergency Medical Condition (MA) What reading provider will be dictating this exam?->CRC FINDINGS: Lower Chest: There are numerous pulmonary nodules within the visualized lung bases. A left lower lobe nodule measures 1.1 cm. A peripheral right lower lobe nodule measures 1 cm. There is trace right pleural effusion. Organs: The liver, gallbladder, spleen, pancreas, adrenals, and right kidney are unremarkable. There is a small left renal cyst. GI/Bowel: There is no evidence of bowel obstruction. No evidence of abnormal bowel wall thickening or distension. There are scattered diverticula. The appendix is not confidently identified. However, there is no inflammatory change in the right lower quadrant to suggest acute appendicitis. Pelvis: The urinary bladder is largely decompressed. Leiomyomatous uterus noted. Peritoneum/Retroperitoneum: There is moderate ascites. There are extensive peritoneal soft tissue nodules with omental caking. No evidence of lymphadenopathy. Aorta is normal in caliber. Bones/Soft Tissues: There are numerous densely sclerotic lesions throughout the visualized osseous structures, favored to represent enostoses. Numerous pulmonary nodules within the visualized lung bases, concerning for pulmonary metastatic disease. Moderate ascites with extensive peritoneal soft tissue nodules and omental caking, compatible with the peritoneal metastatic disease. Leiomyomatous uterus. Densely sclerotic osseous lesions, overall increased in number and size from prior examination. These may represent bone islands. However, given findings above, osseous metastatic disease is not entirely excluded.      XR CHEST PORTABLE    Result Date: 2/27/2023  EXAMINATION: ONE XRAY VIEW OF THE CHEST 2/27/2023 9:51 pm COMPARISON: 01/19/2017 HISTORY: ORDERING SYSTEM PROVIDED HISTORY: Shortness of breath TECHNOLOGIST PROVIDED HISTORY: Reason for exam:->Shortness of breath What reading provider will be dictating this exam?->CRC FINDINGS: The cardiomediastinal silhouette is within normal limits. There are scattered bilateral nodular opacities, measuring up to 9 mm. Streaky left basilar opacity, likely atelectasis. No pneumothorax or pleural effusion. Bilateral nodular opacities, concerning for underlying pulmonary nodules. Recommend attention on subsequent CT abdomen/pelvis. Clinical correlation recommended. Consider further evaluation with dedicated CT chest, as indicated. ASSESSMENT/PLAN :  69-year-old female  - Thyroid disease, HTN, HLD   - Concern of metastatic disease.    - Worsening appetite, nausea, and generalized weakness. Reported vaginal bleeding in which she had an ultrasound recently and was told to follow-up with OB/GYN however was unable to do so due to transport issues. - afebrile and on room air, however tachycardic.    - Chest x-ray with bilateral nodular opacities concerning for underlying pulmonary nodules. No effusions or consolidation.    - CTAP with numerous pulmonary nodules within the visualized lung bases concerning for pulmonary metastatic disease. Moderate ascites with extensive peritoneal soft tissue nodules, compatible with peritoneal metastatic disease. Leiomyomatous uterus. These may represent bone islands, osseous metastatic disease is not entirely excluded. - Tumor markers with Ca 125 77.6, CEA 0.6.   -  CMP with GFR 55 and calcium 8.1. Troponin 16. LFTs unremarkable. CBC with Hgb 11.4, MCV 79.6. Neutrophils 7.65. Attending addendum:  CT scan of abdomen and pelvis reviewed.   Patient with moderate ascites and extensive peritoneal soft tissue nodules with omental caking compatible with peritoneal carcinomatosis and thickened uterus. Patient likely with metastatic GYN primary possibly endometrial with suspicious metastatic lung nodules at the lung bases  CT chest will be done for completion of staging. Recommend GYN consult and pelvic exam for consideration of endometrial biopsy if suspicious findings noted otherwise CT-guided biopsy of omental nodule or pulmonary nodule at the discretion of the invasive radiologist will be planned for tissue diagnosis. Thank you for the consult, we will follow. Patient seen and examined, note edited to reflect above. 3/1/23  - Likely metastatic GYN primary possibly endometrial with suspicious metastatic lung nodules at the lung bases. - CT A/P as above. - CT chest with multiple BL noncalcified pulmonary nodules. Moderate subcarinal adenopathy. Multiple sclerotic foci in the thoracic spine. Small R pleural effusion.  - GYN consulted for pelvic exam for consideration of endometrial biopsy however cannot be done while inpatient. Recommend IR guided biopsy of peritoneal mets/lung nodules per IR.  - Will have biopsy planned at the discretion of the invasive radiologist for tissue diagnosis. - We will follow. 3/2/23  - Likely metastatic GYN primary possibly endometrial with suspicious metastatic lung nodules at the lung bases  - CT A/P and chest as above   - GYN consulted for pelvic exam for consideration of endometrial biopsy however cannot be done while inpatient. - IR guided biopsy of lung nodules completed today, will follow path  -  77.6  - We will follow. 3/3/23  - Likely metastatic GYN primary possibly endometrial with suspicious metastatic lung nodules at the lung bases  - CT A/P and chest as above   - GYN consulted for pelvic exam for consideration of endometrial biopsy however cannot be done while inpatient. - IR guided biopsy of lung nodules completed yesterday. We will follow pathology.   -  77.6      Mable SHETH Barry Rosario - CNP  Electronically signed 3/3/2023 at 11:45 AM  Patient seen and examined.  Note updated  Sheree Galvan MD

## 2023-03-03 NOTE — PROGRESS NOTES
Hospitalist Progress Note      SYNOPSIS: Patient admitted on 2023 for Metastatic cancer to [facility]) PMH of thyroid disease, HTN, HLD who presented to the ED for evaluation of worsening appetite, nausea, and generalized weakness. Patient is also reported vaginal bleeding in which she has had an ultrasound recently and was told to follow-up with OB/GYN however was unable to do so due to transport issues. CTAP with numerous pulmonary nodules within the visualized lung bases concerning for pulmonary metastatic disease. Moderate ascites with extensive peritoneal soft tissue nodules, compatible with peritoneal metastatic disease. Leiomyomatous uterus. These may represent bone islands, osseous metastatic disease is not entirely excluded. Tumor markers with Ca 125 77.5, CEA 0.6. Heme onc following. For CT guided biopsy      SUBJECTIVE:  Stable overnight. No other overnight issues reported. Records reviewed. Was working with PT and became hypotensive and bradycardic, slightly unresponsive, likely syncopal event. Now has recovered  Has had poor po intake      Temp (24hrs), Av.3 °F (36.8 °C), Min:97.9 °F (36.6 °C), Max:99.2 °F (37.3 °C)    DIET: ADULT DIET; Regular  ADULT ORAL NUTRITION SUPPLEMENT; Breakfast, Lunch; Standard High Calorie/High Protein Oral Supplement  CODE: Full Code    Intake/Output Summary (Last 24 hours) at 3/3/2023 1012  Last data filed at 3/2/2023 2142  Gross per 24 hour   Intake 240 ml   Output --   Net 240 ml           OBJECTIVE:    /74   Pulse 97   Temp 98.3 °F (36.8 °C) (Oral)   Resp 20   Ht 5' 2\" (1.575 m)   Wt 110 lb 15.4 oz (50.3 kg)   SpO2 96%   BMI 20.29 kg/m²     General appearance:  awake, alert, and oriented to person, place, time, and purpose; appears stated age and cooperative; no apparent distress no labored breathing  HEENT:  Conjunctivae/corneas clear. Neck: Supple. No jugular venous distention.    Respiratory: symmetrical; clear to auscultation bilaterally; no wheezes; no rhonchi; no rales  Cardiovascular: rhythm regular; rate controlled; no murmurs  Abdomen: Soft, nontender, nondistended  Extremities:  peripheral pulses present; no peripheral edema; no ulcers  Musculoskeletal: No clubbing, cyanosis, no bilateral lower extremity edema. Brisk capillary refill. Skin:  No rashes  on visible skin  Neurologic: awake, alert and following commands   ASSESSMENT and PLAN:    Concern for metastatic disease- CTAP with numerous pulmonary nodules within the visualized lung bases concerning for pulmonary metastatic disease. Moderate ascites with extensive peritoneal soft tissue nodules, compatible with peritoneal metastatic disease. Ca 125 77.6. Oncology following. For CT guided biopsy today. Nausea/vomiting/decreased po intake- due to above. Dietitian. PTOT  Syncopal event- monitor BP, fluids. Hypertension- HCTZ  Hyperlipidemia- statin  Hypothyroidism- synthroid  Moderate malnutrition, POA    Dispo: syncopal event with PT today. She is considering SNF because she lives alone. However due to possible need for chemotherapy this poses a problem.  Need possible heme onc plan once biopsy results obtained    Medications:  REVIEWED DAILY    Infusion Medications    sodium chloride      sodium chloride       Scheduled Medications    [Held by provider] aspirin  81 mg Oral Daily    ferrous sulfate  325 mg Oral Daily with breakfast    hydroCHLOROthiazide  25 mg Oral Daily    levothyroxine  75 mcg Oral Daily    atorvastatin  10 mg Oral Daily    sodium chloride flush  10 mL IntraVENous 2 times per day    [Held by provider] enoxaparin  30 mg SubCUTAneous Daily     PRN Meds: HYDROcodone 5 mg - acetaminophen, sodium chloride flush, sodium chloride, promethazine **OR** ondansetron, polyethylene glycol, acetaminophen **OR** acetaminophen, calcium carbonate, hydrALAZINE, melatonin    Labs:     Recent Labs     03/01/23  0509 03/02/23  0520 03/03/23  0751   WBC 8.9 9.1 7.7   HGB 11.6 11.8 12.5   HCT 33.8* 34.0 35.7    237 261       Recent Labs     03/01/23  0509 03/02/23  0520    136   K 3.7 3.7    97*   CO2 25 26   BUN 19 17   CREATININE 0.9 0.9   CALCIUM 8.0* 8.1*       Recent Labs     03/01/23  0509 03/02/23  0520   PROT 5.2* 5.2*   ALKPHOS 45 46   ALT <5 5   AST 19 19   BILITOT 0.5 0.4       Recent Labs     03/02/23  0520   INR 1.2       No results for input(s): [place] in the last 72 hours. Chronic labs:    Lab Results   Component Value Date    TSH <0.004 (L) 01/12/2011    INR 1.2 03/02/2023       Radiology: REVIEWED DAILY    +++++++++++++++++++++++++++++++++++++++++++++++++  DO Devendra Fournier Physician - [address], [place]  +++++++++++++++++++++++++++++++++++++++++++++++++  NOTE: This report was transcribed using voice recognition software. Every effort was made to ensure accuracy; however, inadvertent computerized transcription errors may be present.

## 2023-03-03 NOTE — PROGRESS NOTES
Physical Therapy  Physical Therapy Initial Assessment       Name: Tahir Castrejon  : 1940  MRN: [id number]      Date of Service: 3/3/2023    Evaluating PT:  Tiago Toth PT, DPT [id number]    Room #:  5057/3522-T  Diagnosis:  Metastatic cancer to lung [facility]) [C78.00]  Pulmonary nodules [R91.8]  Other ascites [R18.8]  Metastatic malignant neoplasm, unspecified site [facility]) [C79.9]  PMHx/PSHx:   has a past medical history of Hyperlipidemia, Hypertension, and Thyroid disease. Procedure/Surgery:  None this admission. Precautions: Falls, bed alarm     SUBJECTIVE:    Pt lives alone in a 1 story home with 1 stair to enter and no rail. Bed is on 1st floor and bath is on 1st floor. Pt ambulated with no AD and was independent PTA. Equipment Owned: cane, 2WW, \"old\" WC   Equipment Needs:  none     OBJECTIVE:   Initial Evaluation  Date: 3/3/23 Treatment Short Term/ Long Term   Goals   AM-PAC 6 Clicks      Was pt agreeable to Eval/treatment? Yes      Does pt have pain? No c/o pain     Bed Mobility  Rolling: SBA  Supine to sit: SBA  Sit to supine: SBA  Scooting: SBA  Rolling: Independent  Supine to sit: Independent  Sit to supine: Independent  Scooting: Independent   Transfers Sit to stand: SBA  Stand to sit: SBA  Stand pivot: SBA with [employer]  Sit to stand: Independent  Stand to sit: Independent  Stand pivot: Modified Independent with [employer]   Ambulation   30 feet with [employer] with Claudette  150 feet with [employer] Modified Independent   Stair negotiation: ascended and descended  NT  4 steps with 1 rail SBA   ROM BUE:  Defer to OT  BLE:  WNL     Strength BUE:  Defer to OT  BLE:  3+/5  Improve 1 MMT   Balance Sitting EOB:  Supervision  Dynamic Standing:  Claudette with [employer]  Sitting EOB:  Independent  Dynamic Standing:  Modified Independent with [employer]     Pt is A & O x 4 (Self, place, time, situation). Pt able to follow simple 2-step commands.    Sensation:  Pt reports tingling in B foot   Edema:  none noted     Vitals:      Spo2 92%  PRE  Spo2 95%  POST    Therapeutic Exercises:  none this session. Patient education  Pt educated on role of PT, [employer] sequence and use, safety with transfers. Patient response to education:   Pt verbalized understanding Pt demonstrated skill Pt requires further education in this area   Yes  Yes  Reinforce      ASSESSMENT:    Conditions Requiring Skilled Therapeutic Intervention:    [x]Decreased strength     []Decreased ROM  [x]Decreased functional mobility  [x]Decreased balance   [x]Decreased endurance   [x]Decreased posture  [x]Decreased sensation  []Decreased coordination   []Decreased vision  [x]Decreased safety awareness   []Increased pain       Comments:  Pt was supine in bed and agreeable to PT evaluation upon arrival. Pt denies pain, dizziness, or light-headedness throughout session. Pt completed bed mobility without physical assistance. Pt completed SPT to bedside chair, pt asymptomatic. Pt ambulated to bathroom with assistance, deficits in propulsion noted. No LOB noted. Pt completed transfer to commode with assistance and use of bathroom rails. Pt voided, able to provide own pericare. Pt ambulated back to bedside chair. Bedside chair cleared by nursing, states no need for chair alarm. Pt educated on [employer] use after discharge for energy conservation and balance. Pt was left in bedside with all needs met at conclusion of session. Pt presents with deficits in endurance. Patient would benefit from continued skilled PT to maximize functional mobility independence. Treatment:  Patient practiced and was instructed in the following treatment:    Bed Mobility: Verbal cues for proper positioning and sequencing to perform bed mobility to facilitate maximum independence. Transfers: Verbal cues for proper positioning and sequencing to perform transfers safely with maximum independence. SPT from multiple surfaces.    Gait Training: Verbal cues for proper positioning and sequencing using assistive device to maximize functional mobility independence. Safety cues for [employer]. Pt education for [employer] use for energy conservation and balance. Vitals and symptoms monitored during session. Pt's/ family goals   1. Get better    Prognosis is good for reaching above PT goals. Patient and or family understand(s) diagnosis, prognosis, and plan of care. Yes     PHYSICAL THERAPY PLAN OF CARE:    PT POC is established based on physician order and patient diagnosis     Referring provider/PT Order:    03/02/23 1130  PT eval and treat  Start:  03/02/23 1130,   End:  03/02/23 1130,   ONE TIME,   Standing Count:  1 Occurrences,   R         Rachel Jo, DO     Diagnosis:  [facility]ast[facility] [C78.00]  Pulmonary nodules [R91.8]  Other ascites [R18.8]  Metastatic malignant neoplasm, unspecified site ([facility] [C79.9]  Specific instructions for next treatment:  Progress functional mobility and ambulation distance     Current Treatment Recommendations:     [x] Strengthening to improve independence with functional mobility   [] ROM to improve independence with functional mobility   [x] Balance Training to improve static/dynamic balance and to reduce fall risk  [x] Endurance Training to improve activity tolerance during functional mobility   [x] Transfer Training to improve safety and independence with all functional transfers   [x] Gait Training to improve gait mechanics, endurance and assess need for appropriate assistive device  [x] Stair Training in preparation for safe discharge home and/or into the community   [] Positioning to prevent skin breakdown and contractures  [x] Safety and Education Training   [x] Patient/Caregiver Education   [x] HEP  [] Other     PT long term treatment goals are located in above grid    Frequency of treatments: 2-5x/week x 1-2 weeks.     Time in  0810  Time out  0835    Total Treatment Time  10 minutes     Evaluation Time includes thorough review of current medical information, gathering information on past medical history/social history and prior level of function, completion of standardized testing/informal observation of tasks, assessment of data and education on plan of care and goals.     CPT codes:  [x] Low Complexity PT evaluation 01823  [] Moderate Complexity PT evaluation 54809  [] High Complexity PT evaluation 29361  [] PT Re-evaluation 72760  [] Gait training 81214 0 minutes  [] Manual therapy 19756 0 minutes  [x] Therapeutic activities 62533 10 minutes  [] Therapeutic exercises 53926 0 minutes  [] Neuromuscular reeducation 69288 0 minutes       Yves Toth PT, DPT  [id number]

## 2023-03-03 NOTE — PROGRESS NOTES
Pt was working with therapy and had brief near syncopal episode, + hypotension and + bradycardia. Patient stated felt dizzy. Notified Dr. Madhuri Hidalgo at bedside shortly after incident. Occupational therapy has a note about this incident. New orders received.

## 2023-03-04 LAB
ALBUMIN SERPL-MCNC: 2.8 G/DL (ref 3.5–5.2)
ALP BLD-CCNC: 46 U/L (ref 35–104)
ALT SERPL-CCNC: <5 U/L (ref 0–32)
ANION GAP SERPL CALCULATED.3IONS-SCNC: 11 MMOL/L (ref 7–16)
AST SERPL-CCNC: 18 U/L (ref 0–31)
BASOPHILS ABSOLUTE: 0.04 E9/L (ref 0–0.2)
BASOPHILS RELATIVE PERCENT: 0.5 % (ref 0–2)
BILIRUB SERPL-MCNC: 0.5 MG/DL (ref 0–1.2)
BUN BLDV-MCNC: 14 MG/DL (ref 6–23)
CALCIUM SERPL-MCNC: 7.4 MG/DL (ref 8.6–10.2)
CHLORIDE BLD-SCNC: 96 MMOL/L (ref 98–107)
CO2: 24 MMOL/L (ref 22–29)
CREAT SERPL-MCNC: 0.8 MG/DL (ref 0.5–1)
EOSINOPHILS ABSOLUTE: 0.04 E9/L (ref 0.05–0.5)
EOSINOPHILS RELATIVE PERCENT: 0.5 % (ref 0–6)
GFR SERPL CREATININE-BSD FRML MDRD: >60 ML/MIN/1.73
GLUCOSE BLD-MCNC: 82 MG/DL (ref 74–99)
HCT VFR BLD CALC: 35 % (ref 34–48)
HEMOGLOBIN: 12 G/DL (ref 11.5–15.5)
IMMATURE GRANULOCYTES #: 0.07 E9/L
IMMATURE GRANULOCYTES %: 0.8 % (ref 0–5)
LYMPHOCYTES ABSOLUTE: 1.23 E9/L (ref 1.5–4)
LYMPHOCYTES RELATIVE PERCENT: 14.2 % (ref 20–42)
MAGNESIUM: 1.7 MG/DL (ref 1.6–2.6)
MCH RBC QN AUTO: 26.8 PG (ref 26–35)
MCHC RBC AUTO-ENTMCNC: 34.3 % (ref 32–34.5)
MCV RBC AUTO: 78.1 FL (ref 80–99.9)
MONOCYTES ABSOLUTE: 0.76 E9/L (ref 0.1–0.95)
MONOCYTES RELATIVE PERCENT: 8.8 % (ref 2–12)
NEUTROPHILS ABSOLUTE: 6.52 E9/L (ref 1.8–7.3)
NEUTROPHILS RELATIVE PERCENT: 75.2 % (ref 43–80)
PDW BLD-RTO: 13.1 FL (ref 11.5–15)
PLATELET # BLD: 229 E9/L (ref 130–450)
PMV BLD AUTO: 11.5 FL (ref 7–12)
POTASSIUM REFLEX MAGNESIUM: 3.5 MMOL/L (ref 3.5–5)
RBC # BLD: 4.48 E12/L (ref 3.5–5.5)
SODIUM BLD-SCNC: 131 MMOL/L (ref 132–146)
TOTAL PROTEIN: 4.9 G/DL (ref 6.4–8.3)
WBC # BLD: 8.7 E9/L (ref 4.5–11.5)

## 2023-03-04 PROCEDURE — 2140000000 HC CCU INTERMEDIATE R&B

## 2023-03-04 PROCEDURE — 6370000000 HC RX 637 (ALT 250 FOR IP): Performed by: INTERNAL MEDICINE

## 2023-03-04 PROCEDURE — 85025 COMPLETE CBC W/AUTO DIFF WBC: CPT

## 2023-03-04 PROCEDURE — 36415 COLL VENOUS BLD VENIPUNCTURE: CPT

## 2023-03-04 PROCEDURE — 6360000002 HC RX W HCPCS: Performed by: FAMILY MEDICINE

## 2023-03-04 PROCEDURE — 6370000000 HC RX 637 (ALT 250 FOR IP): Performed by: FAMILY MEDICINE

## 2023-03-04 PROCEDURE — 2580000003 HC RX 258: Performed by: FAMILY MEDICINE

## 2023-03-04 PROCEDURE — 83735 ASSAY OF MAGNESIUM: CPT

## 2023-03-04 PROCEDURE — 80053 COMPREHEN METABOLIC PANEL: CPT

## 2023-03-04 RX ORDER — DOCUSATE SODIUM 100 MG/1
100 CAPSULE, LIQUID FILLED ORAL 2 TIMES DAILY
Status: DISCONTINUED | OUTPATIENT
Start: 2023-03-04 | End: 2023-03-09 | Stop reason: HOSPADM

## 2023-03-04 RX ORDER — DRONABINOL 2.5 MG/1
2.5 CAPSULE ORAL 2 TIMES DAILY
Status: DISCONTINUED | OUTPATIENT
Start: 2023-03-04 | End: 2023-03-06

## 2023-03-04 RX ADMIN — ATORVASTATIN CALCIUM 10 MG: 10 TABLET, FILM COATED ORAL at 08:41

## 2023-03-04 RX ADMIN — DOCUSATE SODIUM 100 MG: 100 CAPSULE, LIQUID FILLED ORAL at 10:31

## 2023-03-04 RX ADMIN — DOCUSATE SODIUM 100 MG: 100 CAPSULE, LIQUID FILLED ORAL at 21:34

## 2023-03-04 RX ADMIN — SODIUM CHLORIDE, PRESERVATIVE FREE 10 ML: 5 INJECTION INTRAVENOUS at 21:33

## 2023-03-04 RX ADMIN — POLYETHYLENE GLYCOL 3350 17 G: 17 POWDER, FOR SOLUTION ORAL at 08:48

## 2023-03-04 RX ADMIN — ENOXAPARIN SODIUM 30 MG: 100 INJECTION SUBCUTANEOUS at 08:41

## 2023-03-04 RX ADMIN — ASPIRIN 81 MG: 81 TABLET, COATED ORAL at 08:41

## 2023-03-04 RX ADMIN — DRONABINOL 2.5 MG: 2.5 CAPSULE ORAL at 21:34

## 2023-03-04 RX ADMIN — LEVOTHYROXINE SODIUM 75 MCG: 0.07 TABLET ORAL at 05:52

## 2023-03-04 RX ADMIN — HYDROCHLOROTHIAZIDE 25 MG: 25 TABLET ORAL at 08:41

## 2023-03-04 RX ADMIN — FERROUS SULFATE TAB 325 MG (65 MG ELEMENTAL FE) 325 MG: 325 (65 FE) TAB at 08:41

## 2023-03-04 RX ADMIN — DRONABINOL 2.5 MG: 2.5 CAPSULE ORAL at 10:31

## 2023-03-04 RX ADMIN — SODIUM CHLORIDE, PRESERVATIVE FREE 10 ML: 5 INJECTION INTRAVENOUS at 08:41

## 2023-03-04 NOTE — PROGRESS NOTES
Hospitalist Progress Note      SYNOPSIS: Patient admitted on 2023 for Metastatic cancer to [facility]) PMH of thyroid disease, HTN, HLD who presented to the ED for evaluation of worsening appetite, nausea, and generalized weakness. Patient is also reported vaginal bleeding in which she has had an ultrasound recently and was told to follow-up with OB/GYN however was unable to do so due to transport issues. CTAP with numerous pulmonary nodules within the visualized lung bases concerning for pulmonary metastatic disease. Moderate ascites with extensive peritoneal soft tissue nodules, compatible with peritoneal metastatic disease. Leiomyomatous uterus. These may represent bone islands, osseous metastatic disease is not entirely excluded. Tumor markers with Ca 125 77.5, CEA 0.6. Heme onc following. CT guided biopsy 3/2. Awaiting path. May need placement. SUBJECTIVE:  Stable overnight. No other overnight issues reported. Records reviewed. Has had poor po intake  Increase activity  Unsure of discharge plan because of possible need for SNF/chemotherapy      Temp (24hrs), Av °F (36.7 °C), Min:97.3 °F (36.3 °C), Max:98.3 °F (36.8 °C)    DIET: ADULT DIET; Regular  ADULT ORAL NUTRITION SUPPLEMENT; Breakfast, Lunch; Standard High Calorie/High Protein Oral Supplement  CODE: Full Code    Intake/Output Summary (Last 24 hours) at 3/4/2023 0816  Last data filed at 3/3/2023 1614  Gross per 24 hour   Intake 60 ml   Output --   Net 60 ml     Review of Systems  All bolded are positive; please see HPI  General:  Fever, chills, diaphoresis, fatigue, malaise, night sweats, weight loss decreased po intake  Psychological:  Anxiety, disorientation, hallucinations. ENT:  Epistaxis, headaches, vertigo, visual changes. Cardiovascular:  Chest pain, irregular heartbeats, palpitations, paroxysmal nocturnal dyspnea.   Respiratory:  Shortness of breath, coughing, sputum production, hemoptysis, wheezing, orthopnea. Gastrointestinal:  Nausea, vomiting, diarrhea, heartburn, constipation, abdominal pain, hematemesis, hematochezia, melena, acholic stools  Genito-Urinary:  Dysuria, urgency, frequency, hematuria  Musculoskeletal:  Joint pain, joint stiffness, joint swelling, muscle pain  Neurology:  Headache, focal neurological deficits, weakness, numbness, paresthesia  Derm:  Rashes, ulcers, excoriations, bruising  Extremities:  Decreased ROM, peripheral edema, mottling      OBJECTIVE:    /72   Pulse 93   Temp 97.3 °F (36.3 °C) (Temporal)   Resp 18   Ht 5' 2\" (1.575 m)   Wt 110 lb 15.4 oz (50.3 kg)   SpO2 97%   BMI 20.29 kg/m²   General appearance:  awake, alert, and oriented to person, place, time, and purpose; appears stated age and cooperative; no apparent distress no labored breathing  HEENT:  Conjunctivae/corneas clear. Neck: Supple. No jugular venous distention. Respiratory: symmetrical; clear to auscultation bilaterally; no wheezes; no rhonchi; no rales  Cardiovascular: rhythm regular; rate controlled; no murmurs  Abdomen: Soft, nontender, nondistended  Extremities:  peripheral pulses present; no peripheral edema; no ulcers  Musculoskeletal: No clubbing, cyanosis, no bilateral lower extremity edema. Brisk capillary refill. Skin:  No rashes  on visible skin  Neurologic: awake, alert and following commands     ASSESSMENT and PLAN:    Concern for metastatic disease- CTAP with numerous pulmonary nodules within the visualized lung bases concerning for pulmonary metastatic disease. Moderate ascites with extensive peritoneal soft tissue nodules, compatible with peritoneal metastatic disease. Ca 125 77.6. Oncology following. S/p CT guided biopsy. Nausea/vomiting/decreased po intake- due to above. Dietitian. PTOT  Syncopal event- monitor BP, fluids. Hypertension- HCTZ  Hyperlipidemia- statin  Hypothyroidism- synthroid  Moderate malnutrition, POA- document % of meals eaten. Started on marinol. Dispo:   She is considering SNF because she lives alone. However due to possible need for chemotherapy this poses a problem. Need possible heme onc plan once biopsy results obtained    Medications:  REVIEWED DAILY    Infusion Medications    sodium chloride 75 mL/hr at 03/03/23 1350    sodium chloride       Scheduled Medications    [Held by provider] aspirin  81 mg Oral Daily    ferrous sulfate  325 mg Oral Daily with breakfast    hydroCHLOROthiazide  25 mg Oral Daily    levothyroxine  75 mcg Oral Daily    atorvastatin  10 mg Oral Daily    sodium chloride flush  10 mL IntraVENous 2 times per day    [Held by provider] enoxaparin  30 mg SubCUTAneous Daily     PRN Meds: HYDROcodone 5 mg - acetaminophen, sodium chloride flush, sodium chloride, promethazine **OR** ondansetron, polyethylene glycol, acetaminophen **OR** acetaminophen, calcium carbonate, hydrALAZINE, melatonin    Labs:     Recent Labs     03/02/23 0520 03/03/23  0751 03/04/23  0647   WBC 9.1 7.7 8.7   HGB 11.8 12.5 12.0   HCT 34.0 35.7 35.0    261 229       Recent Labs     03/02/23 0520 03/03/23  0751 03/04/23  0647    137 131*   K 3.7 3.8 3.5   CL 97* 99 96*   CO2 26 26 24   BUN 17 18 14   CREATININE 0.9 0.8 0.8   CALCIUM 8.1* 8.1* 7.4*       Recent Labs     03/02/23 0520 03/03/23  0751 03/04/23  0647   PROT 5.2* 5.4* 4.9*   ALKPHOS 46 48 46   ALT 5 5 <5   AST 19 20 18   BILITOT 0.4 0.5 0.5       Recent Labs     03/02/23  0520   INR 1.2       No results for input(s): Katlin Nash in the last 72 hours. Chronic labs:    Lab Results   Component Value Date    TSH <0.004 (L) 01/12/2011    INR 1.2 03/02/2023       Radiology: REVIEWED DAILY    +++++++++++++++++++++++++++++++++++++++++++++++++  DO Devendra Vernon Physician - [address] [place] [address], [place]  +++++++++++++++++++++++++++++++++++++++++++++++++  NOTE: This report was transcribed using voice recognition software.  Every effort was made to ensure accuracy; however, inadvertent computerized transcription errors may be present.

## 2023-03-05 LAB
ALBUMIN SERPL-MCNC: 2.4 G/DL (ref 3.5–5.2)
ALP BLD-CCNC: 47 U/L (ref 35–104)
ALT SERPL-CCNC: 5 U/L (ref 0–32)
ANION GAP SERPL CALCULATED.3IONS-SCNC: 15 MMOL/L (ref 7–16)
AST SERPL-CCNC: 24 U/L (ref 0–31)
BASOPHILS ABSOLUTE: 0.06 E9/L (ref 0–0.2)
BASOPHILS RELATIVE PERCENT: 0.7 % (ref 0–2)
BILIRUB SERPL-MCNC: 0.5 MG/DL (ref 0–1.2)
BUN BLDV-MCNC: 16 MG/DL (ref 6–23)
CALCIUM SERPL-MCNC: 7.4 MG/DL (ref 8.6–10.2)
CHLORIDE BLD-SCNC: 97 MMOL/L (ref 98–107)
CO2: 17 MMOL/L (ref 22–29)
CREAT SERPL-MCNC: 0.9 MG/DL (ref 0.5–1)
EOSINOPHILS ABSOLUTE: 0.05 E9/L (ref 0.05–0.5)
EOSINOPHILS RELATIVE PERCENT: 0.6 % (ref 0–6)
GFR SERPL CREATININE-BSD FRML MDRD: >60 ML/MIN/1.73
GLUCOSE BLD-MCNC: 82 MG/DL (ref 74–99)
HCT VFR BLD CALC: 35.4 % (ref 34–48)
HEMOGLOBIN: 12.1 G/DL (ref 11.5–15.5)
IMMATURE GRANULOCYTES #: 0.06 E9/L
IMMATURE GRANULOCYTES %: 0.7 % (ref 0–5)
LYMPHOCYTES ABSOLUTE: 1.66 E9/L (ref 1.5–4)
LYMPHOCYTES RELATIVE PERCENT: 18.9 % (ref 20–42)
MCH RBC QN AUTO: 26.9 PG (ref 26–35)
MCHC RBC AUTO-ENTMCNC: 34.2 % (ref 32–34.5)
MCV RBC AUTO: 78.7 FL (ref 80–99.9)
MONOCYTES ABSOLUTE: 0.81 E9/L (ref 0.1–0.95)
MONOCYTES RELATIVE PERCENT: 9.2 % (ref 2–12)
NEUTROPHILS ABSOLUTE: 6.15 E9/L (ref 1.8–7.3)
NEUTROPHILS RELATIVE PERCENT: 69.9 % (ref 43–80)
PDW BLD-RTO: 13.2 FL (ref 11.5–15)
PLATELET # BLD: 252 E9/L (ref 130–450)
PMV BLD AUTO: 11.2 FL (ref 7–12)
POTASSIUM REFLEX MAGNESIUM: 4.1 MMOL/L (ref 3.5–5)
RBC # BLD: 4.5 E12/L (ref 3.5–5.5)
REASON FOR REJECTION: NORMAL
REJECTED TEST: NORMAL
SODIUM BLD-SCNC: 129 MMOL/L (ref 132–146)
TOTAL PROTEIN: 5 G/DL (ref 6.4–8.3)
WBC # BLD: 8.8 E9/L (ref 4.5–11.5)

## 2023-03-05 PROCEDURE — 6360000002 HC RX W HCPCS: Performed by: FAMILY MEDICINE

## 2023-03-05 PROCEDURE — 6370000000 HC RX 637 (ALT 250 FOR IP): Performed by: FAMILY MEDICINE

## 2023-03-05 PROCEDURE — 2140000000 HC CCU INTERMEDIATE R&B

## 2023-03-05 PROCEDURE — 2580000003 HC RX 258: Performed by: FAMILY MEDICINE

## 2023-03-05 PROCEDURE — 80053 COMPREHEN METABOLIC PANEL: CPT

## 2023-03-05 PROCEDURE — 85025 COMPLETE CBC W/AUTO DIFF WBC: CPT

## 2023-03-05 PROCEDURE — 6370000000 HC RX 637 (ALT 250 FOR IP): Performed by: INTERNAL MEDICINE

## 2023-03-05 PROCEDURE — 36415 COLL VENOUS BLD VENIPUNCTURE: CPT

## 2023-03-05 RX ADMIN — ATORVASTATIN CALCIUM 10 MG: 10 TABLET, FILM COATED ORAL at 09:13

## 2023-03-05 RX ADMIN — SODIUM CHLORIDE, PRESERVATIVE FREE 10 ML: 5 INJECTION INTRAVENOUS at 21:59

## 2023-03-05 RX ADMIN — SODIUM CHLORIDE, PRESERVATIVE FREE 10 ML: 5 INJECTION INTRAVENOUS at 09:12

## 2023-03-05 RX ADMIN — FERROUS SULFATE TAB 325 MG (65 MG ELEMENTAL FE) 325 MG: 325 (65 FE) TAB at 09:13

## 2023-03-05 RX ADMIN — DRONABINOL 2.5 MG: 2.5 CAPSULE ORAL at 09:13

## 2023-03-05 RX ADMIN — DRONABINOL 2.5 MG: 2.5 CAPSULE ORAL at 21:58

## 2023-03-05 RX ADMIN — LEVOTHYROXINE SODIUM 75 MCG: 0.07 TABLET ORAL at 05:55

## 2023-03-05 RX ADMIN — DOCUSATE SODIUM 100 MG: 100 CAPSULE, LIQUID FILLED ORAL at 09:13

## 2023-03-05 RX ADMIN — ASPIRIN 81 MG: 81 TABLET, COATED ORAL at 09:13

## 2023-03-05 RX ADMIN — HYDROCHLOROTHIAZIDE 25 MG: 25 TABLET ORAL at 09:13

## 2023-03-05 RX ADMIN — ENOXAPARIN SODIUM 30 MG: 100 INJECTION SUBCUTANEOUS at 09:12

## 2023-03-05 NOTE — PROGRESS NOTES
Hospitalist Progress Note      SYNOPSIS: Patient admitted on 2023 for Metastatic cancer to [facility]) PMH of thyroid disease, HTN, HLD who presented to the ED for evaluation of worsening appetite, nausea, and generalized weakness. Patient is also reported vaginal bleeding in which she has had an ultrasound recently and was told to follow-up with OB/GYN however was unable to do so due to transport issues. CTAP with numerous pulmonary nodules within the visualized lung bases concerning for pulmonary metastatic disease. Moderate ascites with extensive peritoneal soft tissue nodules, compatible with peritoneal metastatic disease. Leiomyomatous uterus. These may represent bone islands, osseous metastatic disease is not entirely excluded. Tumor markers with Ca 125 77.5, CEA 0.6. Heme onc following. CT guided biopsy 3/2. Awaiting path. May need placement. SUBJECTIVE:  Stable overnight. No other overnight issues reported. Records reviewed. Has had poor po intake  Increase activity  Unsure of discharge plan because of possible need for SNF/chemotherapy, she states she would like to go home though  States she feels poorly today  Had a bowel movement yesterday      Temp (24hrs), Av.9 °F (36.6 °C), Min:97 °F (36.1 °C), Max:98.4 °F (36.9 °C)    DIET: ADULT DIET; Regular  ADULT ORAL NUTRITION SUPPLEMENT; Breakfast, Lunch; Standard High Calorie/High Protein Oral Supplement  CODE: Full Code    Intake/Output Summary (Last 24 hours) at 3/5/2023 0850  Last data filed at 3/5/2023 2667  Gross per 24 hour   Intake 195 ml   Output 3 ml   Net 192 ml     Review of Systems  All bolded are positive; please see HPI  General:  Fever, chills, diaphoresis, fatigue, malaise, night sweats, weight loss decreased po intake  Psychological:  Anxiety, disorientation, hallucinations. ENT:  Epistaxis, headaches, vertigo, visual changes.   Cardiovascular:  Chest pain, irregular heartbeats, palpitations, paroxysmal nocturnal dyspnea. Respiratory:  Shortness of breath, coughing, sputum production, hemoptysis, wheezing, orthopnea. Gastrointestinal:  Nausea, vomiting, diarrhea, heartburn, constipation, abdominal pain, hematemesis, hematochezia, melena, acholic stools  Genito-Urinary:  Dysuria, urgency, frequency, hematuria  Musculoskeletal:  Joint pain, joint stiffness, joint swelling, muscle pain  Neurology:  Headache, focal neurological deficits, weakness, numbness, paresthesia  Derm:  Rashes, ulcers, excoriations, bruising  Extremities:  Decreased ROM, peripheral edema, mottling      OBJECTIVE:    /65   Pulse 95   Temp 98 °F (36.7 °C) (Temporal)   Resp 16   Ht 5' 2\" (1.575 m)   Wt 110 lb 15.4 oz (50.3 kg)   SpO2 98%   BMI 20.29 kg/m²   General appearance:  awake, alert, and oriented to person, place, time, and purpose; appears stated age and cooperative; no apparent distress no labored breathing  HEENT:  Conjunctivae/corneas clear. Neck: Supple. No jugular venous distention. Respiratory: symmetrical; clear to auscultation bilaterally; no wheezes; no rhonchi; no rales  Cardiovascular: rhythm regular; rate controlled; no murmurs  Abdomen: Soft, nontender, nondistended  Extremities:  peripheral pulses present; no peripheral edema; no ulcers  Musculoskeletal: No clubbing, cyanosis, no bilateral lower extremity edema. Brisk capillary refill. Skin:  No rashes  on visible skin  Neurologic: awake, alert and following commands     ASSESSMENT and PLAN:    Concern for metastatic disease- CTAP with numerous pulmonary nodules within the visualized lung bases concerning for pulmonary metastatic disease. Moderate ascites with extensive peritoneal soft tissue nodules, compatible with peritoneal metastatic disease. Ca 125 77.6. Oncology following. S/p CT guided biopsy. Nausea/vomiting/decreased po intake- due to above. Dietitian. PTOT  Syncopal event- monitor BP, fluids.    Hypertension- HCTZ  Hyperlipidemia- statin  Hypothyroidism- synthroid  Moderate malnutrition, POA- document % of meals eaten. Started on marinol. Dispo: She is considering SNF because she lives alone, but would prefer going home. Need possible heme onc plan once biopsy results obtained. If she feels well enough, possible dc home tomorrow with Kimberly Smith. Will likely need assistance in finding rides to appointments, etc since she lives alone    Medications:  REVIEWED DAILY    Infusion Medications    sodium chloride       Scheduled Medications    dronabinol  2.5 mg Oral BID    docusate sodium  100 mg Oral BID    aspirin  81 mg Oral Daily    ferrous sulfate  325 mg Oral Daily with breakfast    hydroCHLOROthiazide  25 mg Oral Daily    levothyroxine  75 mcg Oral Daily    atorvastatin  10 mg Oral Daily    sodium chloride flush  10 mL IntraVENous 2 times per day    enoxaparin  30 mg SubCUTAneous Daily     PRN Meds: magnesium - glycerin - water, HYDROcodone 5 mg - acetaminophen, sodium chloride flush, sodium chloride, promethazine **OR** ondansetron, polyethylene glycol, acetaminophen **OR** acetaminophen, calcium carbonate, hydrALAZINE, melatonin    Labs:     Recent Labs     03/03/23 0751 03/04/23  0647 03/05/23  0816   WBC 7.7 8.7 8.8   HGB 12.5 12.0 12.1   HCT 35.7 35.0 35.4    229 252       Recent Labs     03/03/23 0751 03/04/23  0647 03/05/23  0646    131* 129*   K 3.8 3.5 4.1   CL 99 96* 97*   CO2 26 24 17*   BUN 18 14 16   CREATININE 0.8 0.8 0.9   CALCIUM 8.1* 7.4* 7.4*       Recent Labs     03/03/23  0751 03/04/23  0647 03/05/23  0646   PROT 5.4* 4.9* 5.0*   ALKPHOS 48 46 47   ALT 5 <5 5   AST 20 18 24   BILITOT 0.5 0.5 0.5       No results for input(s): INR in the last 72 hours. No results for input(s): Gwyn Pears in the last 72 hours.     Chronic labs:    Lab Results   Component Value Date    TSH <0.004 (L) 01/12/2011    INR 1.2 03/02/2023       Radiology: REVIEWED DAILY    +++++++++++++++++++++++++++++++++++++++++++++++++  DO Devendra Child Physician - [address] [place], [place]  +++++++++++++++++++++++++++++++++++++++++++++++++  NOTE: This report was transcribed using voice recognition software. Every effort was made to ensure accuracy; however, inadvertent computerized transcription errors may be present.

## 2023-03-06 LAB
ALBUMIN SERPL-MCNC: 2.8 G/DL (ref 3.5–5.2)
ALP BLD-CCNC: 52 U/L (ref 35–104)
ALT SERPL-CCNC: <5 U/L (ref 0–32)
ANION GAP SERPL CALCULATED.3IONS-SCNC: 16 MMOL/L (ref 7–16)
AST SERPL-CCNC: 19 U/L (ref 0–31)
BASOPHILS ABSOLUTE: 0.05 E9/L (ref 0–0.2)
BASOPHILS RELATIVE PERCENT: 0.5 % (ref 0–2)
BILIRUB SERPL-MCNC: 0.5 MG/DL (ref 0–1.2)
BUN BLDV-MCNC: 17 MG/DL (ref 6–23)
CALCIUM SERPL-MCNC: 7.7 MG/DL (ref 8.6–10.2)
CHLORIDE BLD-SCNC: 96 MMOL/L (ref 98–107)
CO2: 23 MMOL/L (ref 22–29)
CREAT SERPL-MCNC: 0.9 MG/DL (ref 0.5–1)
EOSINOPHILS ABSOLUTE: 0.03 E9/L (ref 0.05–0.5)
EOSINOPHILS RELATIVE PERCENT: 0.3 % (ref 0–6)
GFR SERPL CREATININE-BSD FRML MDRD: >60 ML/MIN/1.73
GLUCOSE BLD-MCNC: 82 MG/DL (ref 74–99)
HCT VFR BLD CALC: 38 % (ref 34–48)
HEMOGLOBIN: 12.8 G/DL (ref 11.5–15.5)
IMMATURE GRANULOCYTES #: 0.08 E9/L
IMMATURE GRANULOCYTES %: 0.9 % (ref 0–5)
LYMPHOCYTES ABSOLUTE: 1.46 E9/L (ref 1.5–4)
LYMPHOCYTES RELATIVE PERCENT: 15.6 % (ref 20–42)
MCH RBC QN AUTO: 26.9 PG (ref 26–35)
MCHC RBC AUTO-ENTMCNC: 33.7 % (ref 32–34.5)
MCV RBC AUTO: 79.8 FL (ref 80–99.9)
MONOCYTES ABSOLUTE: 0.86 E9/L (ref 0.1–0.95)
MONOCYTES RELATIVE PERCENT: 9.2 % (ref 2–12)
NEUTROPHILS ABSOLUTE: 6.86 E9/L (ref 1.8–7.3)
NEUTROPHILS RELATIVE PERCENT: 73.5 % (ref 43–80)
PDW BLD-RTO: 13.2 FL (ref 11.5–15)
PLATELET # BLD: 306 E9/L (ref 130–450)
PMV BLD AUTO: 10.9 FL (ref 7–12)
POTASSIUM REFLEX MAGNESIUM: 4.3 MMOL/L (ref 3.5–5)
RBC # BLD: 4.76 E12/L (ref 3.5–5.5)
SODIUM BLD-SCNC: 135 MMOL/L (ref 132–146)
TOTAL PROTEIN: 5.5 G/DL (ref 6.4–8.3)
WBC # BLD: 9.3 E9/L (ref 4.5–11.5)

## 2023-03-06 PROCEDURE — 2140000000 HC CCU INTERMEDIATE R&B

## 2023-03-06 PROCEDURE — 99222 1ST HOSP IP/OBS MODERATE 55: CPT | Performed by: NURSE PRACTITIONER

## 2023-03-06 PROCEDURE — 80053 COMPREHEN METABOLIC PANEL: CPT

## 2023-03-06 PROCEDURE — 2580000003 HC RX 258: Performed by: FAMILY MEDICINE

## 2023-03-06 PROCEDURE — 97535 SELF CARE MNGMENT TRAINING: CPT

## 2023-03-06 PROCEDURE — 85025 COMPLETE CBC W/AUTO DIFF WBC: CPT

## 2023-03-06 PROCEDURE — 36415 COLL VENOUS BLD VENIPUNCTURE: CPT

## 2023-03-06 PROCEDURE — 6360000002 HC RX W HCPCS: Performed by: FAMILY MEDICINE

## 2023-03-06 PROCEDURE — 6370000000 HC RX 637 (ALT 250 FOR IP): Performed by: FAMILY MEDICINE

## 2023-03-06 PROCEDURE — 6370000000 HC RX 637 (ALT 250 FOR IP): Performed by: INTERNAL MEDICINE

## 2023-03-06 PROCEDURE — 97530 THERAPEUTIC ACTIVITIES: CPT

## 2023-03-06 RX ORDER — DRONABINOL 2.5 MG/1
5 CAPSULE ORAL 2 TIMES DAILY
Status: DISCONTINUED | OUTPATIENT
Start: 2023-03-06 | End: 2023-03-09 | Stop reason: HOSPADM

## 2023-03-06 RX ADMIN — SODIUM CHLORIDE, PRESERVATIVE FREE 10 ML: 5 INJECTION INTRAVENOUS at 21:48

## 2023-03-06 RX ADMIN — ATORVASTATIN CALCIUM 10 MG: 10 TABLET, FILM COATED ORAL at 09:48

## 2023-03-06 RX ADMIN — SODIUM CHLORIDE, PRESERVATIVE FREE 10 ML: 5 INJECTION INTRAVENOUS at 09:57

## 2023-03-06 RX ADMIN — DOCUSATE SODIUM 100 MG: 100 CAPSULE, LIQUID FILLED ORAL at 09:48

## 2023-03-06 RX ADMIN — LEVOTHYROXINE SODIUM 75 MCG: 0.07 TABLET ORAL at 05:35

## 2023-03-06 RX ADMIN — ENOXAPARIN SODIUM 30 MG: 100 INJECTION SUBCUTANEOUS at 09:48

## 2023-03-06 RX ADMIN — DRONABINOL 2.5 MG: 2.5 CAPSULE ORAL at 09:48

## 2023-03-06 RX ADMIN — HYDROCHLOROTHIAZIDE 25 MG: 25 TABLET ORAL at 09:48

## 2023-03-06 RX ADMIN — FERROUS SULFATE TAB 325 MG (65 MG ELEMENTAL FE) 325 MG: 325 (65 FE) TAB at 09:48

## 2023-03-06 RX ADMIN — ASPIRIN 81 MG: 81 TABLET, COATED ORAL at 09:57

## 2023-03-06 NOTE — PROGRESS NOTES
Physical Therapy    Treatment Note      Name: Epi Bernardo  : 1940  MRN: [id number]      Date of Service: 3/6/2023    Evaluating PT:  Tiffany Toth PT, DPT [id number]    Room #:  6181/3701-T  Diagnosis:  Metastatic cancer to lung [facility]) [C78.00]  Pulmonary nodules [R91.8]  Other ascites [R18.8]  Metastatic malignant neoplasm, unspecified site [facility]) [C79.9]  PMHx/PSHx:   has a past medical history of Hyperlipidemia, Hypertension, and Thyroid disease. Procedure/Surgery:  None this admission. Precautions: Falls, bed alarm     SUBJECTIVE:    Pt lives alone in a 1 story home with 1 stair to enter and no rail. Bed is on 1st floor and bath is on 1st floor. Pt ambulated with no AD and was independent PTA. Equipment Owned: cane, 2WW, \"old\" WC   Equipment Needs:  none     OBJECTIVE:   Initial Evaluation  Date: 3/3/23 Treatment Date: 3/6/23 Short Term/ Long Term   Goals   AM-PAC 6 Clicks  47/81    Was pt agreeable to Eval/treatment? Yes  Yes    Does pt have pain? No c/o pain No current complaints of pain    Bed Mobility  Rolling: SBA  Supine to sit: SBA  Sit to supine: SBA  Scooting: SBA Rolling: NT  Supine to sit: SBA  Sit to supine: SBA  Scooting: SBA to EOB Rolling: Independent  Supine to sit: Independent  Sit to supine: Independent  Scooting: Independent   Transfers Sit to stand: SBA  Stand to sit: SBA  Stand pivot: SBA with [employer] Sit to stand: SBA  Stand to sit: SBA  Stand pivot: Min A without AD Sit to stand: Independent  Stand to sit:  Independent  Stand pivot: Modified Independent with [employer]   Ambulation   30 feet with [employer] with Claudette 48 feet with HHA with Min A 150 feet with [employer] Modified Independent   Stair negotiation: ascended and descended  NT NT 4 steps with 1 rail SBA   ROM BUE:  Defer to OT  BLE:  WNL NT    Strength BUE:  Defer to OT  BLE:  3+/5 NT Improve 1 MMT   Balance Sitting EOB:  Supervision  Dynamic Standing:  Claudette with [employer] Sitting EOB:  Supervision  Dynamic Standing: Min A with HHA Sitting EOB: Independent  Dynamic Standing:  Modified Independent with WW     Pt is A & O x: 4 to person, place, month/year, and situation. Sensation: Pt denies numbness and tingling of extremities. Edema: Unremarkable. Patient education  Pt educated on PT role in acute care setting. Patient response to education:   Pt verbalized understanding Pt demonstrated skill Pt requires further education in this area   Yes NA No     ASSESSMENT:    Comments:    Pt was in bed upon room entry; agreeable to PT treatment. Pt reports she feels very weak. Pt ambulated to/from doorway with HHA. Gait was slow with small steps. Pt was slightly unsteady. O2 sat was 97% on RA with all activity. Pt states plan is for discharge home. This PT strongly recommends 24/7 supervision and assist as pt is a fall risk and very weak; SW notified. Pt was left in bed with all needs met at conclusion of session. Treatment:  Patient practiced and was instructed in the following treatment:    Therapeutic activities:  Bed mobility: Pt was cued for technique during bed mobility transfers. Transfers: Pt was cued for hand placement during sit <> stand transfers. Ambulation: Pt ambulated short distance with HHA. Pt was cued for safety. Vitals and symptoms were closely monitored throughout session. Skillful positioning in bed to protect skin/joint integrity. PLAN:    Patient is making Good progress towards established goals. Will continue with current POC.       Time in: 1300  Time out: 1323    Total Treatment Time 23 minutes     CPT codes:  [] Gait training 89509 0 minutes  [] Manual therapy 57285 0 minutes  [x] Therapeutic activities 54269 23 minutes  [] Therapeutic exercises 66434 0 minutes  [] Neuromuscular reeducation 57535 0 minutes      Delon Plasencia PT, DPT   [id number]

## 2023-03-06 NOTE — CARE COORDINATION
Met with patient and friend, Gio Gaspar at bedside per their request. Gio Gaspar has questions regarding a family member being patient's home health aid; provided contact information for her to inquire about this. Provided patient with advanced directives paperwork; she will work on getting paperwork completed. Patient still plans to return home, does not want [facility] and her friend, Gio Gaspar will transport home when discharged. Discussed home health care services, patient agreeable and has no preference on [facility] company. Referral made to [facility] and start of care on 3/10; will need home care orders for skilled nursing/PT/OT. Call made to therapy department for PT/OT updates. The Plan for Transition of Care is related to the following treatment goals: discharge planning    The Patient and/or patient representative Jewelskiaralarry Walton was provided with a choice of provider and agrees   with the discharge plan. [x] Yes [] No    Freedom of choice list was provided with basic dialogue that supports the patient's individualized plan of care/goals, treatment preferences and shares the quality data associated with the providers.  [x] Yes [] No     Nael Salmon, MSW, LSW [phone]

## 2023-03-06 NOTE — PROGRESS NOTES
OT BEDSIDE TREATMENT NOTE   [facility] [address]  [facility]      Date:3/6/2023  Patient Name: Dexter Ojeda  MRN: [id number]  : 1940  Room: [address] [place] [address]     Per OT eval  Evaluating OT: GUNJAN Monaco, OTR/L [id number]  Referring Yasmin Godfrey DO  Specific Provider Orders: OT eval and treat 3/2  Recommended Adaptive Equipment: 24 hr assist      Diagnosis: lung CA   Surgery: none   Pertinent Medical History: HTN, HLD, thyroid disease   Precautions:  Fall Risk, syncope     Assessment of current deficits   [x] Functional mobility           [x]ADLs           [x] Strength                  [x]Cognition   [x] Functional transfers         [x] IADLs         [x] Safety Awareness   [x]Endurance   [] Fine Coordination                         [x] Balance      [] Vision/perception   [x]Sensation     []Gross Motor Coordination             [] ROM           [] Delirium                   [] Motor Control      OT PLAN OF CARE   OT POC based on physician orders, patient diagnosis and results of clinical assessment     Frequency/Duration: 2-4 days/wk for 2 weeks PRN   Specific OT Treatment to include:   * Instruction/training on adapted ADL techniques and AE recommendations to increase functional independence within precautions       * Training on energy conservation strategies, correct breathing pattern and techniques to improve independence/tolerance for self-care routine  * Functional transfer/mobility training/DME recommendations for increased independence, safety, and fall prevention  * Patient/Family education to increase follow through with safety techniques and functional independence  * Recommendation of environmental modifications for increased safety with functional transfers/mobility and ADLs  * Therapeutic exercise to improve motor endurance, ROM, and functional strength for ADLs/functional transfers  * Therapeutic activities to facilitate/challenge dynamic balance, stand tolerance for increased safety and independence with ADLs  * Neuro-muscular re-education: facilitation of righting/equilibrium reactions, midline orientation, scapular stability/mobility, normalization of muscle tone, and facilitation of volitional active controled movement     Home Living: Pt lives alone in a 1 story home; bed/bath on main level   Bathroom setup: tub shower unit   Equipment owned: shower chair, BSC, cane, w/c  Prior Level of Function: IND with ADLs/IADLs; using ww for functional mobility      Pain Level: 0/10  Cognition: A&O: 3/4; Follows multi step directions              Memory:  good              Sequencing:  good              Problem solving:  fair              Judgement/safety:  fair      Functional Assessment:  AM-PAC Daily Activity Raw Score: 19/24    Initial Eval Status  Date: 3/3/23 Treatment Status  Date:3/6/23 STGs=LTGs  Time Frame: 10-14 days   Feeding IND  Independent      Grooming SBA (seated)   SBA  Sitting EOB for safety due to c/o dizziness IND   UB Dressing SBA   SBA  To dof/don gown sitting EOB IND   LB Dressing SBA (pt crossed BLEs to doff/don B socks) SBA  To dof/don pull up brief, pant, socks EOB using cross leg tech IND    Bathing SBA (simulated) SBA   For sponge bathing tasks sitting EOB, vc for task segmentation for energy conservation SUP    Toileting SBA SBA  Per last tx  IND   Bed Mobility  Log roll: NT  Supine to sit: NT   Sit to supine: NT  Log roll: SBA  Supine to sit: SBA   Sit to supine: SBA   Log roll IND  Supine to sit: IND   Sit to supine: IND   Functional Transfers Sit to stand:SBA   Stand to sit:SBA  Commode: SBA Sit to stand:SBA   Stand to sit:SBA  Commode: NT IND   Functional Mobility SBA (using ww, to/from bathroom) SBA  Using ww to take side steps to [facility], pt declined sitting up in chair at this time due to fatigue IND   Balance Sitting: SBA  Standing: SBA Sitting: SBA  Standing: SBA  For safety due to c/o dizziness      Activity Tolerance fair fair      Visual/  Perceptual Glasses: yes                               UE ROM:        RUE:  WFL                  LUE:  WFL  Strength:        RUE: grossly 4/5         LUE: grossly 4/5   Strength: B WFL  Fine Motor Coordination:  WFL      Hearing: WFL  Sensation:  No c/o numbness/tingling   Tone:  WFL  Edema: none noted      Comments: Upon arrival pt supine in bed. ADL retraining to increase safety and indep in dressing and bathing tasks, balance and trf training to increase participation in functional mobility and standing aspects of ADLs with increased safety. Pt educated on energy conservation techniques to increase independence and safety during ADLs, bed mobility, and functional transfers. Pt would benefit from continued skilled OT to increase safety and independence with completion of ADL/IADL tasks for functional independence and quality of life. At end of session pt returned to supine, call light within reach. Pt has made fair progress towards set goals.      Continue with current plan of care    Treatment Time In:1131            Treatment Time Out: 1155             Treatment Charges: Mins Units   Ther Ex  40907     Manual Therapy 18037     Thera Activities 66181     ADL/Home Mgt 80098 24 2   Neuro Re-ed 02944     Group Therapy      Orthotic manage/training  28871     Non-Billable Time     Total Timed Treatment 24 [address] RUBIO/L [address]

## 2023-03-06 NOTE — PLAN OF CARE
Problem: Discharge Planning  Goal: Discharge to home or other facility with appropriate resources  Outcome: Progressing     Problem: ABCDS Injury Assessment  Goal: Absence of physical injury  Outcome: Progressing     Problem: Nutrition Deficit:  Goal: Optimize nutritional status  Outcome: Progressing     Problem: Skin/Tissue Integrity  Goal: Absence of new skin breakdown  Description: 1. Monitor for areas of redness and/or skin breakdown  2. Assess vascular access sites hourly  3. Every 4-6 hours minimum:  Change oxygen saturation probe site  4. Every 4-6 hours:  If on nasal continuous positive airway pressure, respiratory therapy assess nares and determine need for appliance change or resting period.   Outcome: Progressing     Problem: Safety - Adult  Goal: Free from fall injury  Outcome: Progressing

## 2023-03-06 NOTE — PROGRESS NOTES
Hospitalist Progress Note      SYNOPSIS: Patient admitted on 2023 for Metastatic cancer to [facility]) PMH of thyroid disease, HTN, HLD who presented to the ED for evaluation of worsening appetite, nausea, and generalized weakness. Patient is also reported vaginal bleeding in which she has had an ultrasound recently and was told to follow-up with OB/GYN however was unable to do so due to transport issues. CTAP with numerous pulmonary nodules within the visualized lung bases concerning for pulmonary metastatic disease. Moderate ascites with extensive peritoneal soft tissue nodules, compatible with peritoneal metastatic disease. Leiomyomatous uterus. These may represent bone islands, osseous metastatic disease is not entirely excluded. Tumor markers with Ca 125 77.5, CEA 0.6. Heme onc following. CT guided biopsy 3/2. Awaiting path. May need placement. SUBJECTIVE:  Stable overnight. No other overnight issues reported. Records reviewed. Has had poor po intake, has not been tolerating eating, has no appetite, says her mouth feels rotten. Increase activity         Temp (24hrs), Av.2 °F (36.8 °C), Min:97.9 °F (36.6 °C), Max:98.8 °F (37.1 °C)    DIET: ADULT DIET; Regular  ADULT ORAL NUTRITION SUPPLEMENT; Breakfast, Lunch; Standard High Calorie/High Protein Oral Supplement  CODE: Full Code    Intake/Output Summary (Last 24 hours) at 3/6/2023 0806  Last data filed at 3/5/2023 1003  Gross per 24 hour   Intake 120 ml   Output --   Net 120 ml     Review of Systems  All bolded are positive; please see HPI  General:  Fever, chills, diaphoresis, fatigue, malaise, night sweats, weight loss decreased po intake  Psychological:  Anxiety, disorientation, hallucinations. ENT:  Epistaxis, headaches, vertigo, visual changes. Cardiovascular:  Chest pain, irregular heartbeats, palpitations, paroxysmal nocturnal dyspnea.   Respiratory:  Shortness of breath, coughing, sputum production, hemoptysis, wheezing, orthopnea. Gastrointestinal:  Nausea, vomiting, diarrhea, heartburn, constipation, abdominal pain, hematemesis, hematochezia, melena, acholic stools  Genito-Urinary:  Dysuria, urgency, frequency, hematuria  Musculoskeletal:  Joint pain, joint stiffness, joint swelling, muscle pain  Neurology:  Headache, focal neurological deficits, weakness, numbness, paresthesia  Derm:  Rashes, ulcers, excoriations, bruising  Extremities:  Decreased ROM, peripheral edema, mottling      OBJECTIVE:    /71   Pulse 94   Temp 98.2 °F (36.8 °C) (Oral)   Resp 16   Ht 5' 2\" (1.575 m)   Wt 110 lb 15.4 oz (50.3 kg)   SpO2 98%   BMI 20.29 kg/m²   General appearance:  awake, alert, and oriented to person, place, time, and purpose; appears stated age and cooperative; no apparent distress no labored breathing  HEENT:  Conjunctivae/corneas clear. Neck: Supple. No jugular venous distention. Respiratory: symmetrical; clear to auscultation bilaterally; no wheezes; no rhonchi; no rales  Cardiovascular: rhythm regular; rate controlled; no murmurs  Abdomen: Soft, nontender, nondistended  Extremities:  peripheral pulses present; no peripheral edema; no ulcers  Musculoskeletal: No clubbing, cyanosis, no bilateral lower extremity edema. Brisk capillary refill. Skin:  No rashes  on visible skin  Neurologic: awake, alert and following commands     ASSESSMENT and PLAN:    Concern for metastatic disease- CTAP with numerous pulmonary nodules within the visualized lung bases concerning for pulmonary metastatic disease. Moderate ascites with extensive peritoneal soft tissue nodules, compatible with peritoneal metastatic disease. Ca 125 77.6. Likely gyn origin. Oncology following. S/p CT guided biopsy. Path pending. Nausea/vomiting/decreased po intake- due to above. Dietitian. PTOT. Syncopal event- monitor BP, fluids.    Hypertension- HCTZ  Hyperlipidemia- statin  Hypothyroidism- synthroid  Moderate malnutrition, POA- document % of meals eaten. Started on [place]. Patient not tolerating po intake. She admits to no appetite and that her mouth feels like it is rotten. Will consult palliative care to establish goals of care. NEed to establish how aggressive she wants to be, with chemotherapy, possible need for PEG for nutrition, etc.     Dispo:    Patient has not been able to tolerate po intake, has no appetite. Consult palliative to assist with goals of care. PT evaluation today, patient would prefer to go home when discharged from hospital but last time she fainted with PT. Await path    Medications:  REVIEWED DAILY    Infusion Medications    sodium chloride       Scheduled Medications    dronabinol  2.5 mg Oral BID    docusate sodium  100 mg Oral BID    aspirin  81 mg Oral Daily    ferrous sulfate  325 mg Oral Daily with breakfast    hydroCHLOROthiazide  25 mg Oral Daily    levothyroxine  75 mcg Oral Daily    atorvastatin  10 mg Oral Daily    sodium chloride flush  10 mL IntraVENous 2 times per day    enoxaparin  30 mg SubCUTAneous Daily     PRN Meds: HYDROcodone 5 mg - acetaminophen, sodium chloride flush, sodium chloride, promethazine **OR** ondansetron, polyethylene glycol, acetaminophen **OR** acetaminophen, calcium carbonate, hydrALAZINE, melatonin    Labs:     Recent Labs     03/04/23  0647 03/05/23  0816 03/06/23  0525   WBC 8.7 8.8 9.3   HGB 12.0 12.1 12.8   HCT 35.0 35.4 38.0    252 306       Recent Labs     03/04/23  0647 03/05/23  0646 03/06/23  0525   * 129* 135   K 3.5 4.1 4.3   CL 96* 97* 96*   CO2 24 17* 23   BUN 14 16 17   CREATININE 0.8 0.9 0.9   CALCIUM 7.4* 7.4* 7.7*       Recent Labs     03/04/23  0647 03/05/23  0646 03/06/23  0525   PROT 4.9* 5.0* 5.5*   ALKPHOS 46 47 52   ALT <5 5 <5   AST 18 24 19   BILITOT 0.5 0.5 0.5       No results for input(s): INR in the last 72 hours. No results for input(s): Olivia [place] in the last 72 hours.     Chronic labs:    Lab Results   Component Value Date    TSH <0.004 (L) 01/12/2011    INR 1.2 03/02/2023       Radiology: REVIEWED DAILY    +++++++++++++++++++++++++++++++++++++++++++++++++  Saleem Nunez [address]  +++++++++++++++++++++++++++++++++++++++++++++++++  NOTE: This report was transcribed using voice recognition software. Every effort was made to ensure accuracy; however, inadvertent computerized transcription errors may be present.

## 2023-03-06 NOTE — CONSULTS
Palliative Care Department  [phone]  Palliative Care Initial Consult  Provider STEPHANIE Wheatley CNP     Bari Feldman  [phone]  Hospital Day: 8  Date of Initial Consult: 3/6/2023  Referring Provider: Sami Flores DO  Palliative Medicine was consulted for assistance with: Goals of care, CODE STATUS discussion, symptom management    HPI:   Bari Feldman is a 78 y. o. with a medical history of hyperlipidemia, hypertension, thyroid disease who was admitted on 2/27/2023 from home with a CHIEF COMPLAINT of worsening in loss of appetite, nausea, and generalized weakness. Patient states that she has been forcing herself to drink broth and water. Patient also stating that she has had some vaginal spotting. CT abdomen pelvis shows numerous pulmonary nodules within the visualized lung bases concerning for pulmonary metastatic disease, moderate ascites and extensive peritoneal soft tissue nodules and omental caking compatible with peritoneal metastatic disease. Oncology following, stating likely metastatic GYN primary possibly endometrial with suspicious metastatic lung nodules at the lung bases. CT-guided biopsy 3/2, await pathology. Palliative medicine consulted for further assistance. ASSESSMENT/PLAN:     Pertinent Hospital Diagnoses     Concern for metastatic disease  Nausea/vomiting/decreased p.o. intake  Syncopal episode      Palliative Care Encounter / Counseling Regarding Goals of Care  Please see detailed goals of care discussion as below  At this time, Bari Feldman, Does have capacity for medical decision-making.   Capacity is time limited and situation/question specific  During encounter there was no surrogate medical decision-maker  Outcome of goals of care meeting:   Discussed CODE STATUS options  Discussed comfort measures/hospice philosophy  Symptom management  Change CODE STATUS to limited/DNI  Code status Full Code  Advanced Directives: no POA or living will in [facility]  Surrogate/Legal NOK:  Loree Coughlin (child) [phone]      Loss of appetite:   -Increase Marinol 5 mg p.o. twice daily     Nausea:   -IV Zofran 4 mg every 6 hours as needed   -Phenergan 12.5 mg p.o. every 6 hours as needed    Spiritual assessment: no spiritual distress identified  Bereavement and grief: to be determined  Referrals to: none today  SUBJECTIVE:     Current medical issues leading to Palliative Medicine involvement include   Active Hospital Problems    Diagnosis Date Noted    Moderate protein-calorie malnutrition ([facility] [address] [E44.0] 03/01/2023     Priority: Medium    Metastatic cancer to lung [facility]) [C78.00] 02/28/2023     Priority: Medium       Details of Conversation:    Chart reviewed. Patient seen at the bedside, alert and oriented x4, flat affect. Patient able to partake in meaningful conversation and able to make decisions for herself. No family present at bedside. Introduced self and role of palliative medicine. Patient states she does not have a living will or healthcare power of  documents in place. Patient states documents were provided to her by social work so her and her family can fill out accordingly. Patient states that she is  and has 2 adult children. Patient states she would want her daughter, Kati Khanna, to be primary decision maker. Discussed patient's current condition and comorbidities. Discussed goals of care and CODE STATUS options. Patient states that in the event of cardiopulmonary arrest, patient would not want to be resuscitated including intubation. Patient states she has lived her life and she wants to remain comfortable. We discussed comfort measures/hospice philosophy. Patient states she is not sure if she is ready to transition to hospice care. She is in agreement with changing CODE STATUS to limited/DNI. Patient states that she does not want any cancer related treatment if treatment options are available.   She would like to discuss hospice further with her daughter. Call placed to patient's daughter, Jos David, left message to return call. Discussed PEG tube insertion option if appetite does not improve if wanting to pursue with aggressive treatment. Patient states she does not feel a PEG tube would be necessary if she is not pursuing with cancer treatments. Palliative medicine also consulted for symptom management. Patient denies pain, vomiting, constipation. Patient states that she has had some soft stools with incontinence recently. Also, patient has had a loss of appetite and some nausea. Will increase Marinol p.o. to 5 mg twice daily, patient was on Marinol 2.5 mg p.o. twice daily which was started on 3/4. Continue Zofran or Phenergan (patient has not utilized) for nausea. Patient has Norco ordered for pain but has not utilized and denies pain. Palliative medicine will continue to follow. OBJECTIVE:   Prognosis: Guarded    Physical Exam:  /75   Pulse (!) 101   Temp 98.6 °F (37 °C) (Oral)   Resp 16   Ht 5' 2\" (1.575 m)   Wt 110 lb 15.4 oz (50.3 kg)   SpO2 97%   BMI 20.29 kg/m²   Constitutional:  Elderly, thin, NAD, awake, alert  Eyes: no scleral icterus, normal lids, no discharge  ENMT:  Normocephalic, atraumatic, mucosa moist, EOMI  Neck:  trachea midline, no JVD  Lungs:  CTA bilaterally  Heart[de-identified]  RRR  Abd:  Soft, non tender, non distended, bowel sounds present  Ext:  Moving all extremities, no edema, pulses present  Skin:  Warm and dry  Psych: Flat affect  Neuro: Alert and oriented x4, follows commands    Objective data reviewed: labs, images, records, medication use, vitals, and chart    Discussed patient and the plan of care with the other IDT members: Palliative Medicine IDT Team, Floor Nurse, Patient, and Family    Time/Communication  Greater than 50% of time spent, total 55 minutes in counseling and coordination of care at the bedside regarding goals of care, symptom management, diagnosis and prognosis, and see above.     Thank you for allowing Palliative Medicine to participate in the care of Telma Rebolledo.

## 2023-03-07 PROCEDURE — 2140000000 HC CCU INTERMEDIATE R&B

## 2023-03-07 PROCEDURE — 6370000000 HC RX 637 (ALT 250 FOR IP): Performed by: INTERNAL MEDICINE

## 2023-03-07 PROCEDURE — 99231 SBSQ HOSP IP/OBS SF/LOW 25: CPT | Performed by: NURSE PRACTITIONER

## 2023-03-07 PROCEDURE — 6370000000 HC RX 637 (ALT 250 FOR IP): Performed by: NURSE PRACTITIONER

## 2023-03-07 PROCEDURE — 6370000000 HC RX 637 (ALT 250 FOR IP): Performed by: FAMILY MEDICINE

## 2023-03-07 PROCEDURE — 6360000002 HC RX W HCPCS: Performed by: FAMILY MEDICINE

## 2023-03-07 PROCEDURE — 2580000003 HC RX 258: Performed by: FAMILY MEDICINE

## 2023-03-07 RX ADMIN — HYDROCHLOROTHIAZIDE 25 MG: 25 TABLET ORAL at 09:53

## 2023-03-07 RX ADMIN — ASPIRIN 81 MG: 81 TABLET, COATED ORAL at 09:53

## 2023-03-07 RX ADMIN — LEVOTHYROXINE SODIUM 75 MCG: 0.07 TABLET ORAL at 05:55

## 2023-03-07 RX ADMIN — SODIUM CHLORIDE, PRESERVATIVE FREE 10 ML: 5 INJECTION INTRAVENOUS at 21:14

## 2023-03-07 RX ADMIN — ATORVASTATIN CALCIUM 10 MG: 10 TABLET, FILM COATED ORAL at 09:53

## 2023-03-07 RX ADMIN — ENOXAPARIN SODIUM 30 MG: 100 INJECTION SUBCUTANEOUS at 09:53

## 2023-03-07 RX ADMIN — SODIUM CHLORIDE, PRESERVATIVE FREE 10 ML: 5 INJECTION INTRAVENOUS at 09:54

## 2023-03-07 RX ADMIN — FERROUS SULFATE TAB 325 MG (65 MG ELEMENTAL FE) 325 MG: 325 (65 FE) TAB at 09:53

## 2023-03-07 RX ADMIN — DOCUSATE SODIUM 100 MG: 100 CAPSULE, LIQUID FILLED ORAL at 09:53

## 2023-03-07 RX ADMIN — DRONABINOL 5 MG: 2.5 CAPSULE ORAL at 09:53

## 2023-03-07 NOTE — PROGRESS NOTES
Comprehensive Nutrition Assessment    Type and Reason for Visit:  Reassess    Nutrition Recommendations/Plan:   Recommend and start Magic Cup supplement BID and Gelatein supplement BID to help meet increased nutritional needs and d/t decreased po intake of meals. Malnutrition Assessment:  Malnutrition Status: Moderate malnutrition (03/01/23 1208)    Context:  Chronic Illness     Findings of the 6 clinical characteristics of malnutrition:  Energy Intake:  75% or less estimated energy requirements for 1 month or longer  Weight Loss:  Unable to assess (d/t lack of wt hx per EMR)     Body Fat Loss:  Mild body fat loss Orbital   Muscle Mass Loss:  Mild muscle mass loss Temples (temporalis), Clavicles (pectoralis & deltoids)  Fluid Accumulation:  No significant fluid accumulation     Strength:  Not Performed    Nutrition Assessment:    Patients po intake has been sporadic and poor, averaging 25-50% of meals served since admission ; adm w/ worsening in loss of appetite, nausea, and generalized weakness ; noted metastatic lung CA ; noted N/V PTA ; noted lack of appetite for several weeks PTA (note pt reports poor appetite/inadequate oral intake since ~2019) ; s/p syncope ; pt remains moderately malnourished ; will modify ONS    Nutrition Related Findings:    I&Os WNL, no edema, A&O x 4, hypoactive BS, decreased appetite, muscle/fat wasting ; Wound Type: None       Current Nutrition Intake & Therapies:    Average Meal Intake: 26-50%  Average Supplements Intake: 26-50%  ADULT DIET; Regular  ADULT ORAL NUTRITION SUPPLEMENT; Breakfast, Lunch; Standard High Calorie/High Protein Oral Supplement    Anthropometric Measures:  Height: 5' 2\" (157.5 cm)  Ideal Body Weight (IBW): 110 lbs (50 kg)    Admission Body Weight: 110 lb 15.4 oz (50.3 kg) (3/1-BS ; first measured)  Current Body Weight: 110 lb (49.9 kg) (3/1, bedscale), 100 % IBW.  Weight Source: Bed Scale  Current BMI (kg/m2): 20.1  Usual Body Weight:  (DIMA d/t lack of wt hx per EMR)     Weight Adjustment For: No Adjustment                 BMI Categories: Underweight (BMI less than 22) age over 72    Estimated Daily Nutrient Needs:  Energy Requirements Based On: Formula  Weight Used for Energy Requirements: Current  Energy (kcal/day): 7309-6617 ( x 1.3 SF)  Weight Used for Protein Requirements: Current  Protein (g/day): 65-80 (1.3-1.5g/kg CBW)  Method Used for Fluid Requirements: 1 ml/kcal  Fluid (ml/day): 4904-2704    Nutrition Diagnosis:   Moderate malnutrition, In context of chronic illness related to catabolic illness (CA ; poor appetite/intake PTA) as evidenced by Criteria as identified in malnutrition assessment    Nutrition Interventions:   Food and/or Nutrient Delivery: Continue Current Diet, Modify Oral Nutrition Supplement  Nutrition Education/Counseling: Education not indicated  Coordination of Nutrition Care: Continue to monitor while inpatient       Goals:  Previous Goal Met: Progressing toward Goal(s)  Goals: PO intake 50% or greater, by next RD assessment       Nutrition Monitoring and Evaluation:   Behavioral-Environmental Outcomes: None Identified  Food/Nutrient Intake Outcomes: Food and Nutrient Intake, Supplement Intake  Physical Signs/Symptoms Outcomes: Biochemical Data, Chewing or Swallowing, GI Status, Fluid Status or Edema, Hemodynamic Status, Meal Time Behavior, Nausea or Vomiting, Nutrition Focused Physical Findings, Skin, Weight    Discharge Planning:     Too soon to determine     Myke Douglas RD, LD  Contact: [phone]

## 2023-03-07 NOTE — PROGRESS NOTES
Date/Time 11/8/2022    Trauma/Ortho/Medical (Choose one) Ortho    Diagnosis: Right total hip arthroplasty  POD#: 1  Mental Status: A&O x4  Activity/dangle up w/ 1 gb/w  Diet: reg  Pain: managed with Oxycodone  Coleman/Voiding: voiding in the bathroom  Tele/Restraints/Iso:not applicable  02/LDA: room air  D/C Date: possible discharge date 11/8/2022  Other Info: baseline LLE numbness, tachycardiac, Saline locked.     Hospitalist Progress Note      Synopsis:   Patient admitted on 2023 for Metastatic cancer to [facility]) PMH of thyroid disease, HTN, HLD who presented to the ED for evaluation of worsening appetite, nausea, and generalized weakness. Patient is also reported vaginal bleeding in which she has had an ultrasound recently and was told to follow-up with OB/GYN however was unable to do so due to transport issues. CTAP with numerous pulmonary nodules within the visualized lung bases concerning for pulmonary metastatic disease. Moderate ascites with extensive peritoneal soft tissue nodules, compatible with peritoneal metastatic disease. Leiomyomatous uterus. These may represent bone islands, osseous metastatic disease is not entirely excluded. Tumor markers with Ca 125 77.5, CEA 0.6. Heme onc following. CT guided biopsy 3/2. Awaiting path. May need placement. Hospital day 7     Subjective:  Stable overnight. No issues reported. Patient seen and examined  Records reviewed. Temp (24hrs), Av.8 °F (36.6 °C), Min:97 °F (36.1 °C), Max:99 °F (37.2 °C)    DIET: ADULT DIET; Regular  ADULT ORAL NUTRITION SUPPLEMENT; Breakfast, Lunch; Other Oral Supplement; Gelatein  ADULT ORAL NUTRITION SUPPLEMENT; Lunch, Dinner; Frozen Oral Supplement  CODE: Limited  No intake or output data in the 24 hours ending 23 1246      Objective:    /71   Pulse (!) 104   Temp 98.1 °F (36.7 °C)   Resp 16   Ht 5' 2\" (1.575 m)   Wt 110 lb 15.4 oz (50.3 kg)   SpO2 95%   BMI 20.29 kg/m²   General appearance:  awake, alert, and oriented to person, place, time, and purpose; appears stated age and cooperative; no apparent distress no labored breathing  HEENT:  Conjunctivae/corneas clear. Neck: Supple. No jugular venous distention.    Respiratory: symmetrical; clear to auscultation bilaterally; no wheezes; no rhonchi; no rales  Cardiovascular: rhythm regular; rate controlled; no murmurs  Abdomen: Soft, nontender, nondistended  Extremities:  peripheral pulses present; no peripheral edema; no ulcers  Musculoskeletal: No clubbing, cyanosis, no bilateral lower extremity edema. Brisk capillary refill. Skin:  No rashes  on visible skin  Neurologic: awake, alert and following commands       Medications:  REVIEWED DAILY    Infusion Medications    sodium chloride       Scheduled Medications    dronabinol  5 mg Oral BID    docusate sodium  100 mg Oral BID    aspirin  81 mg Oral Daily    ferrous sulfate  325 mg Oral Daily with breakfast    hydroCHLOROthiazide  25 mg Oral Daily    levothyroxine  75 mcg Oral Daily    atorvastatin  10 mg Oral Daily    sodium chloride flush  10 mL IntraVENous 2 times per day    enoxaparin  30 mg SubCUTAneous Daily     PRN Meds: HYDROcodone 5 mg - acetaminophen, sodium chloride flush, sodium chloride, promethazine **OR** ondansetron, polyethylene glycol, acetaminophen **OR** acetaminophen, calcium carbonate, hydrALAZINE, melatonin    Labs:     Recent Labs     03/05/23  0816 03/06/23  0525   WBC 8.8 9.3   HGB 12.1 12.8   HCT 35.4 38.0    306       Recent Labs     03/05/23  0646 03/06/23  0525   * 135   K 4.1 4.3   CL 97* 96*   CO2 17* 23   BUN 16 17   CREATININE 0.9 0.9   CALCIUM 7.4* 7.7*       Recent Labs     03/05/23  0646 03/06/23  0525   PROT 5.0* 5.5*   ALKPHOS 47 52   ALT 5 <5   AST 24 19   BILITOT 0.5 0.5       No results for input(s): INR in the last 72 hours. No results for input(s): Richelle Fuse in the last 72 hours. Chronic labs:    Lab Results   Component Value Date    TSH <0.004 (L) 01/12/2011    INR 1.2 03/02/2023       Radiology: REVIEWED DAILY    Assessment:  Concern for metastatic disease. CT CAP with numerous pulmonary nodules within the visualized lung bases concerning for pulmonary metastatic disease, moderate ascites with extensive peritoneal soft tissue nodules compatible with peritoneal metastatic disease.    Nausea/vomiting/decreased p.o. intake  Syncopal event  Hypertension  Hyperlipidemia  Hypothyroidism  Plan:  Reviewed CT Imaging that revealed numerous pulmonary nodules and concern for pulmonary metastatic disease as well as moderate ascites with peritoneal soft tissue nodules compatible with peritoneal metastatic disease, in light of findings oncology was consulted and agree with plan. Patient underwent CT-guided biopsy with path report pending we will continue to follow. Reviewed ED note that revealed poor oral intake, agree with dietitian PT OT consult and continuing Marinol. Reviewed palliative care note, CODE STATUS changed to limited DNI  Continue home medications  Follow labs replete as indicated      DVT Prophylaxis [x] Lovenox  []  Heparin [] DOAC [] PCDs [] Ambulation    GI Prophylaxis [] PPI  [] H2 Blocker   [] Carafate  [x] Diet/Tube Feeds   Level of care [] Med/Surg  [x] Intermediate  []  ICU   Diet ADULT DIET; Regular  ADULT ORAL NUTRITION SUPPLEMENT; Breakfast, Lunch; Other Oral Supplement; Gelatein  ADULT ORAL NUTRITION SUPPLEMENT; Lunch, Dinner; Frozen Oral Supplement    Family contact [x]  N/A    [] At bedside  [] Phone call   Disposition Patient requires continued admission patient will need discharged with home health care/skilled nursing for additional help with home needs   MDM [] Low    [x] Moderate  []   High       Discharge Plan: Currently awaiting pathology report as well as awaiting skilled home health care    +++++++++++++++++++++++++++++++++++++++++++++++++  Migdalia Paz, [address]  +++++++++++++++++++++++++++++++++++++++++++++++++  NOTE: This report was transcribed using voice recognition software. Every effort was made to ensure accuracy; however, inadvertent computerized transcription errors may be present.

## 2023-03-07 NOTE — CARE COORDINATION
3/7:  Update CM Note:  CM met bedside with pt to dc CM role & dc planning. Pt is on room air at 95%. Pt stated she wants to go home & [facility] is following. Pt is at home alone & may need to contact APS & family. Per pt her family can transport. SW/CM will continue to follow for dc planning.   Electronically signed by Zara Lennon RN on 3/7/2023 at 3:55 PM

## 2023-03-07 NOTE — PROGRESS NOTES
RN asked patient if she would eat any of her dinner. Patient said she didn't want anything. RN encouraged patient to eat and asked patient to even take a few bites. Patient refused and said \" I have a nasty taste in my mouth and it makes me not want to eat anything\" RN offered to assist patient with mouth care and patient declined stating \" I have tried all of that and nothing works\" RN asked patient to even drink a few sips of ensure and patient still refused.

## 2023-03-07 NOTE — PROGRESS NOTES
Palliative Care Department  [phone]  Palliative Care Progress Note  Provider STEPHANIE Ordoñez CNP     Epi Bernardo  [id number]  Hospital Day: 9  Date of Initial Consult: 3/6/2023  Referring Provider: Lucero Barraza DO  Palliative Medicine was consulted for assistance with: Goals of care, CODE STATUS discussion, symptom management    HPI:   Epi Bernardo is a 78 y. o. with a medical history of hyperlipidemia, hypertension, thyroid disease who was admitted on 2/27/2023 from home with a CHIEF COMPLAINT of worsening in loss of appetite, nausea, and generalized weakness. Patient states that she has been forcing herself to drink broth and water. Patient also stating that she has had some vaginal spotting. CT abdomen pelvis shows numerous pulmonary nodules within the visualized lung bases concerning for pulmonary metastatic disease, moderate ascites and extensive peritoneal soft tissue nodules and omental caking compatible with peritoneal metastatic disease. Oncology following, stating likely metastatic GYN primary possibly endometrial with suspicious metastatic lung nodules at the lung bases. CT-guided biopsy 3/2, await pathology. Palliative medicine consulted for further assistance. ASSESSMENT/PLAN:     Pertinent Hospital Diagnoses     Concern for metastatic disease  Nausea/vomiting/decreased p.o. intake  Syncopal episode      Palliative Care Encounter / Counseling Regarding Goals of Care  Please see detailed goals of care discussion as below  At this time, Epi Bernardo, Does have capacity for medical decision-making.   Capacity is time limited and situation/question specific  During encounter there was no surrogate medical decision-maker  Outcome of goals of care meeting:   Continue current medical management  Code status Limited DNI  Advanced Directives: no POA or living will in [facility]  Surrogate/Legal NOK:  Clare Mckeon (child) [phone]  Iban Olivarez (child) [phone]  Anabel Baker (cousin) [phone]      Loss of appetite:   -Continue Marinol 5 mg p.o. twice daily     Nausea:   -IV Zofran 4 mg every 6 hours as needed   -Phenergan 12.5 mg p.o. every 6 hours as needed    Spiritual assessment: no spiritual distress identified  Bereavement and grief: to be determined  Referrals to: none today  SUBJECTIVE:     Current medical issues leading to Palliative Medicine involvement include   Active Hospital Problems    Diagnosis Date Noted    Moderate protein-calorie malnutrition ([facility] [address] [E44.0] 03/01/2023     Priority: Medium    Metastatic cancer to lung [facility]) [C78.00] 02/28/2023     Priority: Medium       Details of Conversation:    Chart reviewed. Patient seen at the bedside, alert and oriented x4, flat affect. Patient able to partake in meaningful conversation and able to make decisions for herself. No family present at bedside. Patient states that she was able to eat grapes and a banana this morning. Appetite is still decreased but encourage patient to eat what sounded good to her and try the nutritional supplementations offered to her with her meals. We will continue current medication regimen. Patient denies vomiting, diarrhea, constipation. Patient states she has some nausea still. Patient wishes to continue current CODE STATUS at this time. Still exhibiting that she would not want any aggressive cancer treatments if offered to her. Patient wishes to discharge home when medically stable. Discussed with her that eventually she may require 24/7 care as she would continue to decline in health with her terminal illness. Patient understands. Patient states that her daughter, Gokul Wyman, will be coming to town on Sunday and staying for about 2 weeks. I spoke with patient's daughter yesterday in regards to patient's wishes. Sheila understands and will respect her mother's wishes.   When she gets into town she will get her situated and determine what plan of care works best for the patient in her current situation. All questions and concerns addressed. Palliative medicine will follow. OBJECTIVE:   Prognosis: Guarded    Physical Exam:  /71   Pulse (!) 104   Temp 98.1 °F (36.7 °C)   Resp 16   Ht 5' 2\" (1.575 m)   Wt 110 lb 15.4 oz (50.3 kg)   SpO2 95%   BMI 20.29 kg/m²   Constitutional:  Elderly, thin, NAD, awake, alert  Eyes: no scleral icterus, normal lids, no discharge  ENMT:  Normocephalic, atraumatic, mucosa moist, EOMI  Neck:  trachea midline, no JVD  Lungs:  CTA bilaterally  Heart[de-identified]  RRR  Abd:  Soft, non tender, non distended, bowel sounds present  Ext:  Moving all extremities, no edema, pulses present  Skin:  Warm and dry  Psych: Flat affect  Neuro: Alert and oriented x4, follows commands    Objective data reviewed: labs, images, records, medication use, vitals, and chart    Discussed patient and the plan of care with the other IDT members: Palliative Medicine IDT Team, Floor Nurse, Patient, and Family    Time/Communication  Greater than 50% of time spent, total 25 minutes in counseling and coordination of care at the bedside regarding goals of care, symptom management, diagnosis and prognosis, and see above. Thank you for allowing Palliative Medicine to participate in the care of Sergei Donnelly.

## 2023-03-07 NOTE — PLAN OF CARE
Problem: Discharge Planning  Goal: Discharge to home or other facility with appropriate resources  Outcome: Progressing     Problem: ABCDS Injury Assessment  Goal: Absence of physical injury  Outcome: Progressing  Flowsheets (Taken 3/7/2023 1408)  Absence of Physical Injury: Implement safety measures based on patient assessment     Problem: Nutrition Deficit:  Goal: Optimize nutritional status  Outcome: Progressing  Flowsheets (Taken 3/7/2023 1107 by Reshma Wong RD, LD)  Nutrient intake appropriate for improving, restoring, or maintaining nutritional needs: Recommend appropriate diets, oral nutritional supplements, and vitamin/mineral supplements     Problem: Skin/Tissue Integrity  Goal: Absence of new skin breakdown  Description: 1. Monitor for areas of redness and/or skin breakdown  2. Assess vascular access sites hourly  3. Every 4-6 hours minimum:  Change oxygen saturation probe site  4. Every 4-6 hours:  If on nasal continuous positive airway pressure, respiratory therapy assess nares and determine need for appliance change or resting period.   Outcome: Progressing     Problem: Safety - Adult  Goal: Free from fall injury  Outcome: Progressing  Flowsheets (Taken 3/7/2023 1405)  Free From Fall Injury: Instruct family/caregiver on patient safety

## 2023-03-08 VITALS
HEART RATE: 118 BPM | TEMPERATURE: 98.3 F | OXYGEN SATURATION: 97 % | BODY MASS INDEX: 20.42 KG/M2 | SYSTOLIC BLOOD PRESSURE: 128 MMHG | RESPIRATION RATE: 18 BRPM | HEIGHT: 62 IN | DIASTOLIC BLOOD PRESSURE: 81 MMHG | WEIGHT: 110.96 LBS

## 2023-03-08 PROCEDURE — 6370000000 HC RX 637 (ALT 250 FOR IP): Performed by: NURSE PRACTITIONER

## 2023-03-08 PROCEDURE — 6370000000 HC RX 637 (ALT 250 FOR IP): Performed by: FAMILY MEDICINE

## 2023-03-08 PROCEDURE — 2580000003 HC RX 258: Performed by: FAMILY MEDICINE

## 2023-03-08 PROCEDURE — 6370000000 HC RX 637 (ALT 250 FOR IP): Performed by: INTERNAL MEDICINE

## 2023-03-08 PROCEDURE — 6360000002 HC RX W HCPCS: Performed by: FAMILY MEDICINE

## 2023-03-08 PROCEDURE — 99231 SBSQ HOSP IP/OBS SF/LOW 25: CPT | Performed by: NURSE PRACTITIONER

## 2023-03-08 RX ORDER — DRONABINOL 5 MG/1
5 CAPSULE ORAL 2 TIMES DAILY
Qty: 60 CAPSULE | Refills: 0 | Status: SHIPPED | OUTPATIENT
Start: 2023-03-08 | End: 2023-04-07

## 2023-03-08 RX ORDER — PSEUDOEPHEDRINE HCL 30 MG
100 TABLET ORAL 2 TIMES DAILY
Qty: 7 CAPSULE | Refills: 0 | Status: SHIPPED | OUTPATIENT
Start: 2023-03-08

## 2023-03-08 RX ADMIN — ENOXAPARIN SODIUM 30 MG: 100 INJECTION SUBCUTANEOUS at 07:45

## 2023-03-08 RX ADMIN — DRONABINOL 5 MG: 2.5 CAPSULE ORAL at 07:45

## 2023-03-08 RX ADMIN — ATORVASTATIN CALCIUM 10 MG: 10 TABLET, FILM COATED ORAL at 07:46

## 2023-03-08 RX ADMIN — FERROUS SULFATE TAB 325 MG (65 MG ELEMENTAL FE) 325 MG: 325 (65 FE) TAB at 07:48

## 2023-03-08 RX ADMIN — HYDROCHLOROTHIAZIDE 25 MG: 25 TABLET ORAL at 07:45

## 2023-03-08 RX ADMIN — DOCUSATE SODIUM 100 MG: 100 CAPSULE, LIQUID FILLED ORAL at 07:45

## 2023-03-08 RX ADMIN — ASPIRIN 81 MG: 81 TABLET, COATED ORAL at 07:45

## 2023-03-08 RX ADMIN — LEVOTHYROXINE SODIUM 75 MCG: 0.07 TABLET ORAL at 05:55

## 2023-03-08 RX ADMIN — SODIUM CHLORIDE, PRESERVATIVE FREE 10 ML: 5 INJECTION INTRAVENOUS at 07:45

## 2023-03-08 NOTE — PROGRESS NOTES
Received a call from [facility] NP. [facility] is requesting nursing to check with oncology if they are ok with discharging patient before pathology results. Message passed on to [address] as patient was already ready for transfer.

## 2023-03-08 NOTE — PROGRESS NOTES
Palliative Care Department  [phone]  Palliative Care Progress Note  Provider Joann Parham, STEPHANIE - MANOJ     Albino Avila  [id number]  Hospital Day: 10  Date of Initial Consult: 3/6/2023  Referring Provider: Macho Reynolds DO  Palliative Medicine was consulted for assistance with: Goals of care, CODE STATUS discussion, symptom management    HPI:   Albino Avila is a 78 y. o. with a medical history of hyperlipidemia, hypertension, thyroid disease who was admitted on 2/27/2023 from home with a CHIEF COMPLAINT of worsening in loss of appetite, nausea, and generalized weakness. Patient states that she has been forcing herself to drink broth and water. Patient also stating that she has had some vaginal spotting. CT abdomen pelvis shows numerous pulmonary nodules within the visualized lung bases concerning for pulmonary metastatic disease, moderate ascites and extensive peritoneal soft tissue nodules and omental caking compatible with peritoneal metastatic disease. Oncology following, stating likely metastatic GYN primary possibly endometrial with suspicious metastatic lung nodules at the lung bases. CT-guided biopsy 3/2, await pathology. Palliative medicine consulted for further assistance. ASSESSMENT/PLAN:     Pertinent Hospital Diagnoses     Concern for metastatic disease  Nausea/vomiting/decreased p.o. intake  Syncopal episode      Palliative Care Encounter / Counseling Regarding Goals of Care  Please see detailed goals of care discussion as below  At this time, Albino Avila, Does have capacity for medical decision-making.   Capacity is time limited and situation/question specific  During encounter there was no surrogate medical decision-maker  Outcome of goals of care meeting:   Continue current medical management  Code status Limited DNI  Advanced Directives: no POA or living will in [facility]  Surrogate/Legal NOK:  Tushar Briceño (child) [phone]  Patience Sullivane (child) [phone]  Oxana Mccullough (cousin) [phone]      Loss of appetite:   -Continue Marinol 5 mg p.o. twice daily     Nausea:   -IV Zofran 4 mg every 6 hours as needed   -Phenergan 12.5 mg p.o. every 6 hours as needed    Spiritual assessment: no spiritual distress identified  Bereavement and grief: to be determined  Referrals to: none today  SUBJECTIVE:     Current medical issues leading to Palliative Medicine involvement include   Active Hospital Problems    Diagnosis Date Noted    Moderate protein-calorie malnutrition ([facility] [address] [E44.0] 03/01/2023     Priority: Medium    Metastatic cancer to lung [facility]) [C78.00] 02/28/2023     Priority: Medium       Details of Conversation:    Chart reviewed. Patient seen at the bedside, alert and oriented x4, flat affect. Patient able to partake in meaningful conversation and able to make decisions for herself. No family present at bedside. The plan is for patient to discharge home with home health care. The patients daughter is coming into town for a couple weeks and her cousin, Stacia Aase, will be able to provide assistance to patient. Continue Marinol for appetite. Denies pain, vomiting, constipation, or diarrhea. Mild nausea. Patient does not want to follow up outpatient with palliative medicine at this time. If patient decides to pursue with aggressive treatment outpatient, referral for palliative medicine can be completed outpatient if needed. PCP to continue Marinol and Zofran. All questions and concerns addressed. Palliative medicine to follow.      OBJECTIVE:   Prognosis: Guarded    Physical Exam:  /74   Pulse (!) 111   Temp 99.1 °F (37.3 °C) (Temporal)   Resp 16   Ht 5' 2\" (1.575 m)   Wt 110 lb 15.4 oz (50.3 kg)   SpO2 98%   BMI 20.29 kg/m²   Constitutional:  Elderly, thin, NAD, awake, alert  Eyes: no scleral icterus, normal lids, no discharge  ENMT:  Normocephalic, atraumatic, mucosa moist, EOMI  Neck:  trachea midline, no JVD  Lungs:  CTA bilaterally  Heart[de-identified]  RRR  Abd:  Soft, non tender, non distended, bowel sounds present  Ext:  Moving all extremities, no edema, pulses present  Skin:  Warm and dry  Psych: Flat affect  Neuro: Alert and oriented x4, follows commands    Objective data reviewed: labs, images, records, medication use, vitals, and chart    Discussed patient and the plan of care with the other IDT members: Palliative Medicine IDT Team, Floor Nurse, Patient, and Family    Time/Communication  Greater than 50% of time spent, total 25 minutes in counseling and coordination of care at the bedside regarding goals of care, symptom management, diagnosis and prognosis, and see above. Thank you for allowing Palliative Medicine to participate in the care of Aria Castillo.

## 2023-03-08 NOTE — DISCHARGE SUMMARY
Hospitalist Discharge Summary    Patient ID: Sheree Beltran   Patient : 1940  Patient's PCP: Jazlyn Mcmillan DO    Admit Date: 2023   Admitting Physician: Melissa Cunningham DO    Discharge Date:  3/8/2023   Discharge Physician: STEPHANIE Robison - CNP   Discharge Condition: Stable  Discharge Disposition: [facility] course in brief:  (Please refer to daily progress notes for a comprehensive review of the hospitalization by requesting medical records)  Patient admitted on 2023 for Metastatic cancer to [facility]) PMH of thyroid disease, HTN, HLD who presented to the ED for evaluation of worsening appetite, nausea, and generalized weakness. Patient is also reported vaginal bleeding in which she has had an ultrasound recently and was told to follow-up with OB/GYN however was unable to do so due to transport issues. CTAP with numerous pulmonary nodules within the visualized lung bases concerning for pulmonary metastatic disease. Moderate ascites with extensive peritoneal soft tissue nodules, compatible with peritoneal metastatic disease. Leiomyomatous uterus. These may represent bone islands, osseous metastatic disease is not entirely excluded. Tumor markers with Ca 125 77.5, CEA 0.6. Heme onc following. CT guided biopsy 3/2. Awaiting path. Oncology, ok with patient following up outpatient. Patent alert and oriented x 3 and agreeable to discharge. Offered home health to patient and patient declined. Patients states her wishes are to go home and \"pass away\". Reinforced importance of transferring safely by sitting for a few moments before standing and then waiting a few moments before utilizing mobility devices for ambulating. Palliative and hospice services were consulted for outpatient. Patient stable from medical perspective for discharge.       Consults:   IP CONSULT TO HOSPITALIST  IP CONSULT TO HEM/ONC  IP CONSULT TO OB GYN  IP CONSULT TO DIETITIAN  TEST MOCK CON71  IP CONSULT TO PALLIATIVE CARE    Discharge Diagnoses:  Concern for metastatic disease. CT CAP with numerous pulmonary nodules within the visualized lung bases concerning for pulmonary metastatic disease, moderate ascites with extensive peritoneal soft tissue nodules compatible with peritoneal metastatic disease. Nausea/vomiting/decreased p.o. intake  Syncopal event  Hypertension  Hyperlipidemia  Hypothyroidism      Discharge Instructions / Follow up:    No future appointments. The patient's condition is stable. At this time the patient is without objective evidence of an acute process requiring continuing hospitalization or inpatient management. They are stable for discharge with outpatient follow-up. I have spoken with the patient and discussed the results of the current hospitalization, in addition to providing specific details for the plan of care and counseling regarding the diagnosis and prognosis. The plan has been discussed in detail and they are aware of the specific conditions for emergent return, as well as the importance of follow-up. Their questions are answered at this time and they are agreeable with the plan for discharge to home. Continued appropriate risk factor modification of blood pressure, diabetes and serum lipids will remain essential to reducing risk of future atherosclerotic development    Activity: activity as tolerated    Physical exam:  General appearance: No apparent distress, appears stated age and cooperative. HEENT: Normal cephalic, atraumatic without obvious deformity. Pupils equal, round, and reactive to light. Extra ocular muscles intact. Conjunctivae/corneas clear. Neck: Supple, with full range of motion. No jugular venous distention. Trachea midline. Respiratory:  Clear to auscultation bilaterally. No apparent distress. Cardiovascular:  Regular rate and rhythm. S1, S2 without murmurs, rubs, or gallops. PV: Brisk capillary refill.   +2 pedal and radial pulses bilaterally. No clubbing, cyanosis, edema of bilateral lower extremities. Abdomen: Soft, non-tender, non-distended. +BS  Musculoskeletal: No obvious deformities or erythematous or edematous joints. Skin: Normal skin color. No rashes or lesions. Neurologic:  Neurovascularly intact without any focal sensory/motor deficits. Cranial nerves: II-XII intact, grossly non-focal.  Psychiatric: Alert and oriented, thought content appropriate, normal insight    Significant labs:  CBC:   Recent Labs     03/06/23  0525   WBC 9.3   RBC 4.76   HGB 12.8   HCT 38.0   MCV 79.8*   RDW 13.2        BMP:   Recent Labs     03/06/23  0525      K 4.3   CL 96*   CO2 23   BUN 17   CREATININE 0.9     LFT:  Recent Labs     03/06/23  0525   PROT 5.5*   ALKPHOS 52   ALT <5   AST 19   BILITOT 0.5     PT/INR: No results for input(s): INR, APTT in the last 72 hours. BNP: No results for input(s): BNP in the last 72 hours. Hgb A1C: No results found for: LABA1C  Folate and B12: No results found for: YNQFMDEH34, No results found for: FOLATE  Thyroid Studies:   Lab Results   Component Value Date    TSH <0.004 (L) 01/12/2011       Urinalysis:    Lab Results   Component Value Date/Time    NITRU Negative 02/27/2023 02:15 PM    [address] 2-5 02/27/2023 02:15 PM    BACTERIA FEW 02/27/2023 02:15 PM    RBCUA 2-5 02/27/2023 02:15 PM    BLOODU MODERATE 02/27/2023 02:15 PM    SPECGRAV >=1.030 02/27/2023 02:15 PM    GLUCOSEU Negative 02/27/2023 02:15 PM       Imaging:  CT CHEST W CONTRAST    Result Date: 3/1/2023  EXAMINATION: CT OF THE CHEST WITH CONTRAST 3/1/2023 7:37 am TECHNIQUE: CT of the chest was performed with the administration of intravenous contrast. Multiplanar reformatted images are provided for review. Automated exposure control, iterative reconstruction, and/or weight based adjustment of the mA/kV was utilized to reduce the radiation dose to as low as reasonably achievable. COMPARISON: None.  HISTORY: ORDERING SYSTEM PROVIDED HISTORY: lung nodules TECHNOLOGIST PROVIDED HISTORY: Reason for exam:->lung nodules What reading provider will be dictating this exam?->CRC FINDINGS: Mediastinum: Thyroid gland is atrophic. Upper mediastinum appears normal. Aorta and aortic arch appears normal.  Great vessels appear normal. Pulmonary artery is normal in size. There is moderate to severe subcarinal lymphadenopathy with a lymph node mass measuring 2.4 cm in short axis. No the evidence of bulky hilar adenopathy. Heart is normal in size. No significant axillary adenopathy. Lungs/pleura: There is a small right pleural effusion and right lower lobe atelectasis versus pneumonia. Minimal left lower lobe atelectasis. Multiple bilateral pulmonary nodules are identified. Largest on the left in the anterior left upper lobe on image 61 series 3 measures 12 mm. There numerous other left-sided nodules. Largest on the right is in the medial posterior costophrenic angle measuring 17 mm within atelectatic lung. Other smaller right-sided nodules are present. No pneumothorax. Upper Abdomen: There is moderate upper abdominal ascites. Liver appears normal in size. The adrenal glands appear normal.  Please see CT abdomen from 02/27/2023. Soft Tissues/Bones: Multiple sclerotic foci are noted in the thoracic spine at T3, T4, T5, T8 and T12. Findings suggest sclerotic metastases. There is a central superior endplate partial compression deformity of T6, age indeterminate. 1.  Multiple bilateral noncalcified pulmonary nodules as described above. 2.  Moderate subcarinal adenopathy. 3.  Multiple sclerotic foci in the thoracic spine 4. Small right pleural effusion with right basilar atelectasis or pneumonia.  5.  Findings compatible with pulmonary, mediastinal and thoracic metastatic disease in the chest.     CT GUIDED NEEDLE PLACEMENT    Result Date: 3/2/2023  PROCEDURE: CT GUIDED NDL PLACEMENT; CT BIOPSY ABD/RETRO MODERATE CONSCIOUS SEDATION 3/2/2023 HISTORY: ORDERING SYSTEM PROVIDED HISTORY: bx of either omental nodule or pulm nodule, at IR discretion. Aspirin and lovenox have been on hold TECHNOLOGIST PROVIDED HISTORY: Reason for exam:->bx of either omental nodule or pulm nodule, at IR discretion. Aspirin and lovenox have been on hold What reading provider will be dictating this exam?->CRC TECHNIQUE: Automated exposure control, iterative reconstruction, and/or weight based adjustment of the mA/kV was utilized to reduce the radiation dose to as low as reasonably achievable. CONTRAST: None SEDATION: Moderate sedation was ordered and supervised by the attending with physician face-to-face monitoring. Medications were provided and recorded by Radiology nurses. FLUOROSCOPY DOSE AND TYPE: Radiation Exposure Index: DAP 1186.59 mGY-cm DESCRIPTION OF PROCEDURE: Informed consent was obtained after a detailed explanation of the procedure including risks, benefits, and alternatives. Universal protocol was observed. Sterile gowns, masks, hats and gloves utilized for maximal sterile barrier. FINDINGS: The procedure, risks, alternatives and complications were explained to the patient and written informed consent obtained. A time out for the procedure was performed. CT scan of the chest was performed, this demonstrated decrease in size of the previously visualized mass in the right lower along therefore decision was made to biopsy the omental masses. The patient was positioned in the left lateral decubitus position on the CT table. A CT scan of the abdomen was performed with an overlying marker grid in position. Decision was made to biopsy the large mass in the inferior medial aspect of the right lobe of the liver. An access site for biopsy was marked on the patient's skin. The skin overlying the access site was prepped and draped in the usual sterile manner. Lidocaine 1% local anesthesia was applied to the skin and subcutaneous soft tissues after which a small skin nick was made.  A 12 gauge introducer needle was inserted through the incision into the omental lesions in the right abdomen, the inner stylet of the introducer was removed, an 18 gauge coaxial core biopsy needle was then inserted through the introducer and core biopsy specimens were obtained and placed in formalin, the samples were sent to lab for analysis. Surgi-Foam embolization of the biopsy site and access tract was then performed, the needle was removed, manual pressure was applied and complete hemostasis was obtained. Sterile dressing was then applied. Follow-up CT scan demonstrated no evidence of complications. The patient tolerated the procedure well with no immediate complications and was transferred to the floor in stable condition. CT-guided percutaneous biopsy of omental masses. CT ABDOMEN PELVIS W IV CONTRAST Additional Contrast? None    Result Date: 2/27/2023  EXAMINATION: CT OF THE ABDOMEN AND PELVIS WITH CONTRAST 2/27/2023 10:17 pm TECHNIQUE: CT of the abdomen and pelvis was performed with the administration of intravenous contrast. Multiplanar reformatted images are provided for review. Automated exposure control, iterative reconstruction, and/or weight based adjustment of the mA/kV was utilized to reduce the radiation dose to as low as reasonably achievable. COMPARISON: 10/13/2009 HISTORY: ORDERING SYSTEM PROVIDED HISTORY: nausea + weight loss TECHNOLOGIST PROVIDED HISTORY: Reason for exam:->nausea + weight loss Additional Contrast?->None Decision Support Exception - unselect if not a suspected or confirmed emergency medical condition->Emergency Medical Condition (MA) What reading provider will be dictating this exam?->CRC FINDINGS: Lower Chest: There are numerous pulmonary nodules within the visualized lung bases. A left lower lobe nodule measures 1.1 cm. A peripheral right lower lobe nodule measures 1 cm. There is trace right pleural effusion. Organs:  The liver, gallbladder, spleen, pancreas, adrenals, and right kidney are unremarkable. There is a small left renal cyst. GI/Bowel: There is no evidence of bowel obstruction. No evidence of abnormal bowel wall thickening or distension. There are scattered diverticula. The appendix is not confidently identified. However, there is no inflammatory change in the right lower quadrant to suggest acute appendicitis. Pelvis: The urinary bladder is largely decompressed. Leiomyomatous uterus noted. Peritoneum/Retroperitoneum: There is moderate ascites. There are extensive peritoneal soft tissue nodules with omental caking. No evidence of lymphadenopathy. Aorta is normal in caliber. Bones/Soft Tissues: There are numerous densely sclerotic lesions throughout the visualized osseous structures, favored to represent enostoses. Numerous pulmonary nodules within the visualized lung bases, concerning for pulmonary metastatic disease. Moderate ascites with extensive peritoneal soft tissue nodules and omental caking, compatible with the peritoneal metastatic disease. Leiomyomatous uterus. Densely sclerotic osseous lesions, overall increased in number and size from prior examination. These may represent bone islands. However, given findings above, osseous metastatic disease is not entirely excluded. XR CHEST PORTABLE    Result Date: 2/27/2023  EXAMINATION: ONE XRAY VIEW OF THE CHEST 2/27/2023 9:51 pm COMPARISON: 01/19/2017 HISTORY: ORDERING SYSTEM PROVIDED HISTORY: Shortness of breath TECHNOLOGIST PROVIDED HISTORY: Reason for exam:->Shortness of breath What reading provider will be dictating this exam?->CRC FINDINGS: The cardiomediastinal silhouette is within normal limits. There are scattered bilateral nodular opacities, measuring up to 9 mm. Streaky left basilar opacity, likely atelectasis. No pneumothorax or pleural effusion. Bilateral nodular opacities, concerning for underlying pulmonary nodules. Recommend attention on subsequent CT abdomen/pelvis. Clinical correlation recommended. Consider further evaluation with dedicated CT chest, as indicated. CT BIOPSY ABDOMEN RETROPERITONEUM    Result Date: 3/2/2023  PROCEDURE: CT GUIDED NDL PLACEMENT; CT BIOPSY ABD/RETRO MODERATE CONSCIOUS SEDATION 3/2/2023 HISTORY: ORDERING SYSTEM PROVIDED HISTORY: bx of either omental nodule or pulm nodule, at IR discretion. Aspirin and lovenox have been on hold TECHNOLOGIST PROVIDED HISTORY: Reason for exam:->bx of either omental nodule or pulm nodule, at IR discretion. Aspirin and lovenox have been on hold What reading provider will be dictating this exam?->CRC TECHNIQUE: Automated exposure control, iterative reconstruction, and/or weight based adjustment of the mA/kV was utilized to reduce the radiation dose to as low as reasonably achievable. CONTRAST: None SEDATION: Moderate sedation was ordered and supervised by the attending with physician face-to-face monitoring. Medications were provided and recorded by Radiology nurses. FLUOROSCOPY DOSE AND TYPE: Radiation Exposure Index: DAP 1186.59 mGY-cm DESCRIPTION OF PROCEDURE: Informed consent was obtained after a detailed explanation of the procedure including risks, benefits, and alternatives. Universal protocol was observed. Sterile gowns, masks, hats and gloves utilized for maximal sterile barrier. FINDINGS: The procedure, risks, alternatives and complications were explained to the patient and written informed consent obtained. A time out for the procedure was performed. CT scan of the chest was performed, this demonstrated decrease in size of the previously visualized mass in the right lower along therefore decision was made to biopsy the omental masses. The patient was positioned in the left lateral decubitus position on the CT table. A CT scan of the abdomen was performed with an overlying marker grid in position. Decision was made to biopsy the large mass in the inferior medial aspect of the right lobe of the liver.   An access site for biopsy was marked on the patient's skin. The skin overlying the access site was prepped and draped in the usual sterile manner. Lidocaine 1% local anesthesia was applied to the skin and subcutaneous soft tissues after which a small skin nick was made. A 16 gauge introducer needle was inserted through the incision into the omental lesions in the right abdomen, the inner stylet of the introducer was removed, an 18 gauge coaxial core biopsy needle was then inserted through the introducer and core biopsy specimens were obtained and placed in formalin, the samples were sent to lab for analysis. Surgi-Foam embolization of the biopsy site and access tract was then performed, the needle was removed, manual pressure was applied and complete hemostasis was obtained. Sterile dressing was then applied. Follow-up CT scan demonstrated no evidence of complications. The patient tolerated the procedure well with no immediate complications and was transferred to the floor in stable condition. CT-guided percutaneous biopsy of omental masses. Discharge Medications:      Medication List        START taking these medications      docusate 100 MG Caps  Commonly known as: COLACE, DULCOLAX  Take 100 mg by mouth 2 times daily     dronabinol 5 MG capsule  Commonly known as: MARINOL  Take 1 capsule by mouth 2 times daily for 30 days.  Max Daily Amount: 10 mg     ondansetron 4 MG disintegrating tablet  Commonly known as: ZOFRAN-ODT  Take 1 tablet by mouth 3 times daily as needed for Nausea or Vomiting            CONTINUE taking these medications      alendronate 70 MG tablet  Commonly known as: FOSAMAX     aspirin 81 MG EC tablet     calcium carbonate 600 MG Tabs tablet     ferrous sulfate 325 (65 Fe) MG tablet  Commonly known as: IRON 325     hydroCHLOROthiazide 25 MG tablet  Commonly known as: HYDRODIURIL  Take 1 tablet by mouth daily     levothyroxine 75 MCG tablet  Commonly known as: SYNTHROID     multivitamin tablet     simvastatin 20 MG tablet  Commonly known as: ZOCOR     Vitamin D3 50 MCG (2000 UT) Caps               Where to Get Your Medications        These medications were sent to [facility], [address] [address]      Phone: [phone]   docusate 100 MG Caps  ondansetron 4 MG disintegrating tablet       You can get these medications from any pharmacy    Bring a paper prescription for each of these medications  dronabinol 5 MG capsule         Time Spent on discharge is more than 45 minutes in the examination, evaluation, counseling and review of medications and discharge plan.    +++++++++++++++++++++++++++++++++++++++++++++++++  STEPHANIE Gerardo/ Jarrod [address]  +++++++++++++++++++++++++++++++++++++++++++++++++  NOTE: This report was transcribed using voice recognition software. Every effort was made to ensure accuracy; however, inadvertent computerized transcription errors may be present.

## 2023-03-08 NOTE — CARE COORDINATION
Received a message from 80, patient now unable to ambulate and needs transport home arranged. Met with patient and family at bedside; patient reports she feels too weak. Family report the plan is still for the patient to discharge home, would like ambulance transport home arranged; confirmed home address. Daughter, Lele Velasquez at bedside and she reports she will be staying home with the patient and providing 24/7 assist. Family now requesting a bedside commode and walker; no preference on DME company. Ambulance transport home set up for 9:30P (next available) via Tatyana Dear [phone]; family and nurse informed. 3:54P  Referral made to [facility]; DME to be delivered to patient's home tomorrow; family informed.     Nael Salmon, EVELIA, LSW [phone]

## 2023-03-08 NOTE — PROGRESS NOTES
Nurse to nurse given to Yasmani Andrade on 84. Patient requested [facility] be notified of transfer. [facility] notified of new room assignment.

## 2023-03-08 NOTE — CARE COORDINATION
Discharge order noted. Patient to discharge home today. Spoke with patient and she still does not want to go to a [facility] and will d/c home; she is agreeable for her daughter to be called. Called patient's daughter, Allison Leal to provide update regarding patient's discharge to home and 24/7 supervision recommended. Sheila states she lives in [place] and will arrive here on Sunday, [facility] patient's cousin lives in [facility] and can help out. Call placed to [facility] and she will be in shortly to  the patient; provided update regarding assist at home. Informed her of [facility] to see patient on 3/10, described services by [facility] in depth; [facility] would like to be the point of contact, [address] [facility] notified. 1:15P  Met with patient and cousin, [facility] at bedside per their request. [facility] inquired about 24/7 services available at home for the patient; explained list for private pay caregivers can be provided and patient's insurance does not cover 24/7 care at home. Discussed [facility] was offered to patient and she has consistently refused; advised patient alert and oriented x4 and able to make her own decisions. [facility] states they are not going to make her go to a facility and don't want her to have to sign over her belongings. [facility] states she will stay with the patient and provide assist until her daughter, [address] gets into town. Discussed outpatient palliative referral; patient in agreement and has no preference on agency. Patient to discharge home with [facility]; all questions and concerns addressed. Referral made to [facility] with [address] and hospice services; they will be out in the next 24 hrs to assess the patient. The Plan for Transition of Care is related to the following treatment goals: discharge planning    The Patient and/or patient representative Thedore [place] was provided with a choice of provider and agrees   with the discharge plan.  [x] Yes [] No    Freedom of choice list was provided with basic dialogue that supports the patient's individualized plan of care/goals, treatment preferences and shares the quality data associated with the providers.  [x] Yes [] No     Jessica Walker, MSW, LSW [phone]

## 2023-03-09 NOTE — PROGRESS NOTES
Patient discharged via [facility], without incident. Belongings at side.